# Patient Record
Sex: FEMALE | Race: WHITE | NOT HISPANIC OR LATINO
[De-identification: names, ages, dates, MRNs, and addresses within clinical notes are randomized per-mention and may not be internally consistent; named-entity substitution may affect disease eponyms.]

---

## 2018-02-26 ENCOUNTER — RESULT CHARGE (OUTPATIENT)
Age: 43
End: 2018-02-26

## 2018-02-27 ENCOUNTER — NON-APPOINTMENT (OUTPATIENT)
Age: 43
End: 2018-02-27

## 2018-02-27 ENCOUNTER — APPOINTMENT (OUTPATIENT)
Dept: FAMILY MEDICINE | Facility: CLINIC | Age: 43
End: 2018-02-27
Payer: COMMERCIAL

## 2018-02-27 VITALS
SYSTOLIC BLOOD PRESSURE: 128 MMHG | RESPIRATION RATE: 12 BRPM | DIASTOLIC BLOOD PRESSURE: 80 MMHG | WEIGHT: 200 LBS | BODY MASS INDEX: 35.44 KG/M2 | HEART RATE: 65 BPM | HEIGHT: 63 IN | OXYGEN SATURATION: 98 %

## 2018-02-27 DIAGNOSIS — Z87.898 PERSONAL HISTORY OF OTHER SPECIFIED CONDITIONS: ICD-10-CM

## 2018-02-27 DIAGNOSIS — Z00.00 ENCOUNTER FOR GENERAL ADULT MEDICAL EXAMINATION W/OUT ABNORMAL FINDINGS: ICD-10-CM

## 2018-02-27 DIAGNOSIS — Z87.09 PERSONAL HISTORY OF OTHER DISEASES OF THE RESPIRATORY SYSTEM: ICD-10-CM

## 2018-02-27 DIAGNOSIS — Z78.9 OTHER SPECIFIED HEALTH STATUS: ICD-10-CM

## 2018-02-27 LAB
BILIRUB UR QL STRIP: NEGATIVE
GLUCOSE UR-MCNC: NEGATIVE
HCG UR QL: 0.2 EU/DL
HGB UR QL STRIP.AUTO: NEGATIVE
KETONES UR-MCNC: NEGATIVE
LEUKOCYTE ESTERASE UR QL STRIP: NEGATIVE
NITRITE UR QL STRIP: NEGATIVE
PH UR STRIP: 7
PROT UR STRIP-MCNC: NEGATIVE
SP GR UR STRIP: 1.02

## 2018-02-27 PROCEDURE — 99396 PREV VISIT EST AGE 40-64: CPT | Mod: 25

## 2018-02-27 PROCEDURE — 93000 ELECTROCARDIOGRAM COMPLETE: CPT

## 2018-02-27 PROCEDURE — 81003 URINALYSIS AUTO W/O SCOPE: CPT | Mod: QW

## 2019-04-25 ENCOUNTER — APPOINTMENT (OUTPATIENT)
Dept: RADIOLOGY | Facility: CLINIC | Age: 44
End: 2019-04-25
Payer: COMMERCIAL

## 2019-04-25 PROCEDURE — 74220 X-RAY XM ESOPHAGUS 1CNTRST: CPT

## 2019-05-09 ENCOUNTER — TRANSCRIPTION ENCOUNTER (OUTPATIENT)
Age: 44
End: 2019-05-09

## 2019-05-09 ENCOUNTER — OUTPATIENT (OUTPATIENT)
Dept: OUTPATIENT SERVICES | Facility: HOSPITAL | Age: 44
LOS: 1 days | End: 2019-05-09
Payer: COMMERCIAL

## 2019-05-09 PROCEDURE — 71046 X-RAY EXAM CHEST 2 VIEWS: CPT | Mod: 26

## 2019-05-13 ENCOUNTER — APPOINTMENT (OUTPATIENT)
Dept: RADIOLOGY | Facility: CLINIC | Age: 44
End: 2019-05-13

## 2019-05-29 ENCOUNTER — APPOINTMENT (OUTPATIENT)
Dept: CARDIOLOGY | Facility: CLINIC | Age: 44
End: 2019-05-29
Payer: COMMERCIAL

## 2019-05-29 ENCOUNTER — NON-APPOINTMENT (OUTPATIENT)
Age: 44
End: 2019-05-29

## 2019-05-29 VITALS
WEIGHT: 202 LBS | OXYGEN SATURATION: 99 % | HEIGHT: 63 IN | BODY MASS INDEX: 35.79 KG/M2 | DIASTOLIC BLOOD PRESSURE: 80 MMHG | SYSTOLIC BLOOD PRESSURE: 132 MMHG | HEART RATE: 81 BPM

## 2019-05-29 DIAGNOSIS — Z82.49 FAMILY HISTORY OF ISCHEMIC HEART DISEASE AND OTHER DISEASES OF THE CIRCULATORY SYSTEM: ICD-10-CM

## 2019-05-29 PROCEDURE — 93000 ELECTROCARDIOGRAM COMPLETE: CPT

## 2019-05-29 PROCEDURE — 99205 OFFICE O/P NEW HI 60 MIN: CPT

## 2019-05-29 RX ORDER — FERROUS SULFATE 325(65) MG
325 (65 FE) TABLET ORAL DAILY
Refills: 0 | Status: ACTIVE | COMMUNITY

## 2019-05-29 RX ORDER — ZINC OXIDE 13 %
CREAM (GRAM) TOPICAL DAILY
Refills: 0 | Status: ACTIVE | COMMUNITY

## 2019-05-29 RX ORDER — OMEGA-3/DHA/EPA/FISH OIL 300-1000MG
CAPSULE ORAL DAILY
Refills: 0 | Status: ACTIVE | COMMUNITY

## 2019-05-29 NOTE — PHYSICAL EXAM
[General Appearance - Well Developed] : well developed [Normal Appearance] : normal appearance [Well Groomed] : well groomed [No Deformities] : no deformities [General Appearance - Well Nourished] : well nourished [General Appearance - In No Acute Distress] : no acute distress [Normal Conjunctiva] : the conjunctiva exhibited no abnormalities [Normal Oral Mucosa] : normal oral mucosa [Eyelids - No Xanthelasma] : the eyelids demonstrated no xanthelasmas [No Oral Pallor] : no oral pallor [No Oral Cyanosis] : no oral cyanosis [Normal Jugular Venous A Waves Present] : normal jugular venous A waves present [Normal Jugular Venous V Waves Present] : normal jugular venous V waves present [Heart Sounds] : normal S1 and S2 [No Jugular Venous Castaneda A Waves] : no jugular venous castaneda A waves [Heart Rate And Rhythm] : heart rate and rhythm were normal [Murmurs] : no murmurs present [Respiration, Rhythm And Depth] : normal respiratory rhythm and effort [Exaggerated Use Of Accessory Muscles For Inspiration] : no accessory muscle use [Auscultation Breath Sounds / Voice Sounds] : lungs were clear to auscultation bilaterally [Abdomen Soft] : soft [Abdomen Tenderness] : non-tender [Abdomen Mass (___ Cm)] : no abdominal mass palpated [Abnormal Walk] : normal gait [Gait - Sufficient For Exercise Testing] : the gait was sufficient for exercise testing [Cyanosis, Localized] : no localized cyanosis [Nail Clubbing] : no clubbing of the fingernails [Petechial Hemorrhages (___cm)] : no petechial hemorrhages [Skin Color & Pigmentation] : normal skin color and pigmentation [No Venous Stasis] : no venous stasis [] : no rash [Skin Lesions] : no skin lesions [No Xanthoma] : no  xanthoma was observed [No Skin Ulcers] : no skin ulcer [Affect] : the affect was normal [Oriented To Time, Place, And Person] : oriented to person, place, and time [No Anxiety] : not feeling anxious [Mood] : the mood was normal

## 2019-05-30 NOTE — HISTORY OF PRESENT ILLNESS
[FreeTextEntry1] : HPI:\par Maria Eugenia Stokes is a very pleasant 43-year-old female patient, came for preop cardiac clearance prior to gastric sleeve procedure.\par \par The patient has been suffering from life long obesity.  She is trying to rule out, but unfortunately she has been unsuccessful.  Now, she has been planned for gastric sleeve procedure.\par \par The patient has good functional capacity, but she does get short of breath very easily.  Denies PND, orthopnea, diaphoresis, dizziness, palpitations, or pedal edema.\par \par The patient also has a few episode of heartburn, although she is not too specific about it.  Although, she has no exertional chest pain.\par \par No prior history of CHF, MI, or syncope.  She is very much concerned about strong family history of premature coronary artery disease.\par

## 2019-05-30 NOTE — ASSESSMENT
[FreeTextEntry1] :  \par Morbid obesity.  The patient is planned for gastric sleeve procedure.\par \par Atypical chest pain describes as heartburn.  I have recommended echocardiogram for LV wall motion, LVEF and regular stress test to see inducible ischemia.  \par \par Risk factor modification has been discussed with her at a great length.  She will be reevaluated by me after cardiac testing.\par

## 2019-06-12 ENCOUNTER — APPOINTMENT (OUTPATIENT)
Dept: CARDIOLOGY | Facility: CLINIC | Age: 44
End: 2019-06-12
Payer: COMMERCIAL

## 2019-06-12 PROCEDURE — 93015 CV STRESS TEST SUPVJ I&R: CPT

## 2019-06-12 PROCEDURE — 93306 TTE W/DOPPLER COMPLETE: CPT

## 2019-06-18 ENCOUNTER — APPOINTMENT (OUTPATIENT)
Dept: CARDIOLOGY | Facility: CLINIC | Age: 44
End: 2019-06-18
Payer: COMMERCIAL

## 2019-06-18 VITALS
HEART RATE: 74 BPM | OXYGEN SATURATION: 96 % | DIASTOLIC BLOOD PRESSURE: 74 MMHG | BODY MASS INDEX: 35.43 KG/M2 | SYSTOLIC BLOOD PRESSURE: 122 MMHG | WEIGHT: 200 LBS

## 2019-06-18 PROCEDURE — 99214 OFFICE O/P EST MOD 30 MIN: CPT

## 2019-06-18 NOTE — ASSESSMENT
[FreeTextEntry1] :  PLAN 6-18-19\par \par -Atypical chest pain describes as heartburn and sob with risk factors of obesity, elevated triglycerides and family hx. Echocardiogram as above with  noted echodensity above the MV in LV cavity.  Possible artifact versus prominent papillary muscle or other mass lesion. Cardiac MRI with contrast was advised for further evaluation. ETT with noted shortness of breath and equivocal for ischemia by EKG. Given patient's risk factors, persistent shortness of breath, family history and pending surgery we have advised a stress echocardiogram for further ischemic evaluation. She's currently stable.\par \par -Preop. We will discuss clearance once testing has been reviewed.\par \par -Lipid panel as above with elevated triglycerides. ASCVD risk <1%. Patient aware to follow a strict low transected treated fat diet.\par \par -Morbid obesity.  The patient is planned for gastric sleeve procedure.\par \par \par We will follow the patient after recommended testing has been completed, sooner if any changes in symptoms or status\par \par Sincerely,\par \par Missy Swanson PA-C\par patient reviewed and plan advised by supervising physician Dr. Valentino\par \par

## 2019-06-18 NOTE — HISTORY OF PRESENT ILLNESS
[FreeTextEntry1] : \par Mrs. Stokes is a very pleasant 43-year-old female patient that came in today 6-18-19 to review the results of her cardiac testing and finish preop cardiac clearance prior to gastric sleeve procedure.(pending august)\par \par She was last seen 5-29-19. \par As mentioned previously she has been suffering from life long obesity and has been unsuccessful with weight loss.  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 \par \par As mentioned previously she does get mild short of breath x years with no recent changes.\par The patient also had a few episode of intense heartburn (lasting several hours, not related to eating), described as burning. Not alleviated by anything in particular, noted at rest. She had the episodes 2x about a week apart. Last episode was a couple of months ago. \par She has chronic transient palpitations, that have been unchanged x years, no associated symptoms. \par She denies syncope, orthopena and PND. \par \par No prior history of CHF, MI, or syncope.  She is very much concerned about strong family history of premature coronary artery disease.\par \par Labs/tests\par \par Labs 11-21-18 [id number], TSH 1.22, LDL 93, triglycerides 188, HDL 57, ALT 11, AST 18, BUN 15, creatinine 0.62, potassium 4.9, sodium 139, hemoglobin 12.8, hematocrit 40.1, platelets 310 and white blood cell 7.5\par \par Echocardiogram 6-12-19 EF 65% with normal ventricular function, minimal MR, mild TR, minimal MD, normal pulmonary pressures. Echodensity above and the PDA and LV cavity possible artifact versus prominent papillary muscle or other mass lesion\par \par ETT 6-12-19 80 she exercised 7 minutes and 26 seconds reaching 85% of MPHR. Shortness of breath was noted. EKG changes equivocal for ischemia\par

## 2019-06-18 NOTE — REVIEW OF SYSTEMS
[Shortness Of Breath] : shortness of breath [Chest Pain] : chest pain [Palpitations] : palpitations [Heartburn] : heartburn

## 2019-06-18 NOTE — PHYSICAL EXAM
[Normal Appearance] : normal appearance [No Deformities] : no deformities [] : no respiratory distress [Respiration, Rhythm And Depth] : normal respiratory rhythm and effort [Exaggerated Use Of Accessory Muscles For Inspiration] : no accessory muscle use [Bowel Sounds] : normal bowel sounds [Abdomen Soft] : soft [Abnormal Walk] : normal gait [Skin Color & Pigmentation] : normal skin color and pigmentation [Affect] : the affect was normal [Mood] : the mood was normal [FreeTextEntry1] : mild edema with chronic compression stockings due to varicosities

## 2019-06-26 ENCOUNTER — APPOINTMENT (OUTPATIENT)
Dept: CARDIOLOGY | Facility: CLINIC | Age: 44
End: 2019-06-26
Payer: COMMERCIAL

## 2019-06-26 PROCEDURE — 93351 STRESS TTE COMPLETE: CPT

## 2019-06-28 ENCOUNTER — TRANSCRIPTION ENCOUNTER (OUTPATIENT)
Age: 44
End: 2019-06-28

## 2019-07-18 ENCOUNTER — APPOINTMENT (OUTPATIENT)
Dept: MRI IMAGING | Facility: CLINIC | Age: 44
End: 2019-07-18

## 2019-07-22 ENCOUNTER — OUTPATIENT (OUTPATIENT)
Dept: OUTPATIENT SERVICES | Facility: HOSPITAL | Age: 44
LOS: 1 days | End: 2019-07-22
Payer: COMMERCIAL

## 2019-07-22 ENCOUNTER — APPOINTMENT (OUTPATIENT)
Dept: MRI IMAGING | Facility: HOSPITAL | Age: 44
End: 2019-07-22

## 2019-07-22 DIAGNOSIS — Z00.00 ENCOUNTER FOR GENERAL ADULT MEDICAL EXAMINATION WITHOUT ABNORMAL FINDINGS: ICD-10-CM

## 2019-07-22 DIAGNOSIS — I51.89 OTHER ILL-DEFINED HEART DISEASES: ICD-10-CM

## 2019-07-22 PROCEDURE — 75561 CARDIAC MRI FOR MORPH W/DYE: CPT | Mod: 26

## 2019-07-22 PROCEDURE — A9585: CPT

## 2019-07-22 PROCEDURE — 75561 CARDIAC MRI FOR MORPH W/DYE: CPT

## 2019-07-24 ENCOUNTER — OUTPATIENT (OUTPATIENT)
Dept: OUTPATIENT SERVICES | Facility: HOSPITAL | Age: 44
LOS: 1 days | End: 2019-07-24

## 2019-07-24 ENCOUNTER — APPOINTMENT (OUTPATIENT)
Dept: MRI IMAGING | Facility: HOSPITAL | Age: 44
End: 2019-07-24

## 2019-07-24 DIAGNOSIS — I51.89 OTHER ILL-DEFINED HEART DISEASES: ICD-10-CM

## 2019-07-24 PROCEDURE — 75557 CARDIAC MRI FOR MORPH: CPT

## 2019-07-24 PROCEDURE — 75557 CARDIAC MRI FOR MORPH: CPT | Mod: 26

## 2019-07-29 ENCOUNTER — APPOINTMENT (OUTPATIENT)
Dept: CARDIOLOGY | Facility: CLINIC | Age: 44
End: 2019-07-29
Payer: COMMERCIAL

## 2019-07-29 VITALS
HEIGHT: 63 IN | HEART RATE: 80 BPM | BODY MASS INDEX: 35.44 KG/M2 | OXYGEN SATURATION: 95 % | WEIGHT: 200 LBS | DIASTOLIC BLOOD PRESSURE: 78 MMHG | SYSTOLIC BLOOD PRESSURE: 116 MMHG

## 2019-07-29 DIAGNOSIS — I51.89 OTHER ILL-DEFINED HEART DISEASES: ICD-10-CM

## 2019-07-29 DIAGNOSIS — R10.9 UNSPECIFIED ABDOMINAL PAIN: ICD-10-CM

## 2019-07-29 PROCEDURE — 99214 OFFICE O/P EST MOD 30 MIN: CPT

## 2019-07-29 RX ORDER — ALPRAZOLAM 0.5 MG/1
0.5 TABLET ORAL
Qty: 2 | Refills: 0 | Status: DISCONTINUED | COMMUNITY
Start: 2019-07-10 | End: 2019-07-29

## 2019-07-31 ENCOUNTER — OUTPATIENT (OUTPATIENT)
Dept: OUTPATIENT SERVICES | Facility: HOSPITAL | Age: 44
LOS: 1 days | End: 2019-07-31

## 2019-10-09 ENCOUNTER — OUTPATIENT (OUTPATIENT)
Dept: OUTPATIENT SERVICES | Facility: HOSPITAL | Age: 44
LOS: 1 days | End: 2019-10-09

## 2019-10-15 ENCOUNTER — INPATIENT (INPATIENT)
Facility: HOSPITAL | Age: 44
LOS: 0 days | Discharge: HOME | End: 2019-10-16
Payer: COMMERCIAL

## 2019-10-15 PROCEDURE — 74220 X-RAY XM ESOPHAGUS 1CNTRST: CPT | Mod: 26

## 2019-10-28 ENCOUNTER — OUTPATIENT (OUTPATIENT)
Dept: OUTPATIENT SERVICES | Facility: HOSPITAL | Age: 44
LOS: 1 days | End: 2019-10-28

## 2020-01-29 ENCOUNTER — APPOINTMENT (OUTPATIENT)
Dept: CARDIOLOGY | Facility: CLINIC | Age: 45
End: 2020-01-29
Payer: COMMERCIAL

## 2020-01-29 ENCOUNTER — NON-APPOINTMENT (OUTPATIENT)
Age: 45
End: 2020-01-29

## 2020-01-29 VITALS
DIASTOLIC BLOOD PRESSURE: 70 MMHG | BODY MASS INDEX: 33.38 KG/M2 | OXYGEN SATURATION: 100 % | HEIGHT: 60 IN | HEART RATE: 60 BPM | SYSTOLIC BLOOD PRESSURE: 116 MMHG | WEIGHT: 170 LBS

## 2020-01-29 DIAGNOSIS — Z82.49 FAMILY HISTORY OF ISCHEMIC HEART DISEASE AND OTHER DISEASES OF THE CIRCULATORY SYSTEM: ICD-10-CM

## 2020-01-29 DIAGNOSIS — R55 SYNCOPE AND COLLAPSE: ICD-10-CM

## 2020-01-29 DIAGNOSIS — R06.02 SHORTNESS OF BREATH: ICD-10-CM

## 2020-01-29 DIAGNOSIS — E66.01 MORBID (SEVERE) OBESITY DUE TO EXCESS CALORIES: ICD-10-CM

## 2020-01-29 DIAGNOSIS — R12 HEARTBURN: ICD-10-CM

## 2020-01-29 PROCEDURE — 99214 OFFICE O/P EST MOD 30 MIN: CPT

## 2020-01-29 PROCEDURE — 93000 ELECTROCARDIOGRAM COMPLETE: CPT

## 2020-01-29 RX ORDER — CHLORHEXIDINE GLUCONATE 4 %
400 LIQUID (ML) TOPICAL DAILY
Refills: 0 | Status: ACTIVE | COMMUNITY

## 2020-01-29 RX ORDER — BIOTIN 10 MG
10000 TABLET ORAL DAILY
Refills: 0 | Status: ACTIVE | COMMUNITY

## 2020-01-29 RX ORDER — CHROMIUM 200 MCG
1000 TABLET ORAL DAILY
Refills: 0 | Status: ACTIVE | COMMUNITY

## 2020-01-29 RX ORDER — NICOTINE POLACRILEX 2 MG
5000 LOZENGE BUCCAL DAILY
Refills: 0 | Status: ACTIVE | COMMUNITY

## 2020-01-29 RX ORDER — NICOTINE POLACRILEX 2 MG
5000 LOZENGE BUCCAL
Refills: 0 | Status: ACTIVE | COMMUNITY

## 2020-01-29 RX ORDER — MULTIVIT-MIN/IRON/FOLIC ACID/K 18-600-40
500 CAPSULE ORAL DAILY
Refills: 0 | Status: ACTIVE | COMMUNITY

## 2020-04-26 ENCOUNTER — MESSAGE (OUTPATIENT)
Age: 45
End: 2020-04-26

## 2020-05-07 ENCOUNTER — OUTPATIENT (OUTPATIENT)
Dept: OUTPATIENT SERVICES | Facility: HOSPITAL | Age: 45
LOS: 1 days | End: 2020-05-07

## 2020-05-07 ENCOUNTER — APPOINTMENT (OUTPATIENT)
Dept: DISASTER EMERGENCY | Facility: HOSPITAL | Age: 45
End: 2020-05-07

## 2020-05-08 LAB
SARS-COV-2 IGG SERPL IA-ACNC: <0.1 INDEX
SARS-COV-2 IGG SERPL QL IA: NEGATIVE

## 2020-07-14 ENCOUNTER — APPOINTMENT (OUTPATIENT)
Age: 45
End: 2020-07-14
Payer: COMMERCIAL

## 2020-07-14 ENCOUNTER — RESULT CHARGE (OUTPATIENT)
Age: 45
End: 2020-07-14

## 2020-07-14 VITALS
HEIGHT: 63 IN | DIASTOLIC BLOOD PRESSURE: 80 MMHG | BODY MASS INDEX: 25.69 KG/M2 | WEIGHT: 145 LBS | SYSTOLIC BLOOD PRESSURE: 127 MMHG

## 2020-07-14 DIAGNOSIS — Z83.3 FAMILY HISTORY OF DIABETES MELLITUS: ICD-10-CM

## 2020-07-14 DIAGNOSIS — Z87.828 PERSONAL HISTORY OF OTHER (HEALED) PHYSICAL INJURY AND TRAUMA: ICD-10-CM

## 2020-07-14 DIAGNOSIS — Z82.49 FAMILY HISTORY OF ISCHEMIC HEART DISEASE AND OTHER DISEASES OF THE CIRCULATORY SYSTEM: ICD-10-CM

## 2020-07-14 DIAGNOSIS — Z83.49 FAMILY HISTORY OF OTHER ENDOCRINE, NUTRITIONAL AND METABOLIC DISEASES: ICD-10-CM

## 2020-07-14 DIAGNOSIS — N81.9 FEMALE GENITAL PROLAPSE, UNSPECIFIED: ICD-10-CM

## 2020-07-14 DIAGNOSIS — Z78.9 OTHER SPECIFIED HEALTH STATUS: ICD-10-CM

## 2020-07-14 DIAGNOSIS — Z87.01 PERSONAL HISTORY OF PNEUMONIA (RECURRENT): ICD-10-CM

## 2020-07-14 DIAGNOSIS — Z86.39 PERSONAL HISTORY OF OTHER ENDOCRINE, NUTRITIONAL AND METABOLIC DISEASE: ICD-10-CM

## 2020-07-14 DIAGNOSIS — Z80.9 FAMILY HISTORY OF MALIGNANT NEOPLASM, UNSPECIFIED: ICD-10-CM

## 2020-07-14 DIAGNOSIS — Z82.3 FAMILY HISTORY OF STROKE: ICD-10-CM

## 2020-07-14 DIAGNOSIS — Z60.2 PROBLEMS RELATED TO LIVING ALONE: ICD-10-CM

## 2020-07-14 DIAGNOSIS — Z87.09 PERSONAL HISTORY OF OTHER DISEASES OF THE RESPIRATORY SYSTEM: ICD-10-CM

## 2020-07-14 LAB
BILIRUB UR QL STRIP: NEGATIVE
CLARITY UR: CLEAR
COLLECTION METHOD: NORMAL
GLUCOSE UR-MCNC: NEGATIVE
HCG UR QL: 0.2 EU/DL
HGB UR QL STRIP.AUTO: NEGATIVE
KETONES UR-MCNC: NEGATIVE
LEUKOCYTE ESTERASE UR QL STRIP: NEGATIVE
NITRITE UR QL STRIP: NEGATIVE
PH UR STRIP: 7
PROT UR STRIP-MCNC: NEGATIVE
SP GR UR STRIP: 1.01

## 2020-07-14 PROCEDURE — 99205 OFFICE O/P NEW HI 60 MIN: CPT

## 2020-07-14 SDOH — SOCIAL STABILITY - SOCIAL INSECURITY: PROBLEMS RELATED TO LIVING ALONE: Z60.2

## 2020-07-14 NOTE — LETTER BODY
[Dear  ___] : Dear ~LIBERTAD, [Attached please find my note.] : Attached please find my note. [Thank you very much for allowing me to participate in the care of this patient. If you have any questions, please do not hesitate to contact me] : Thank you very much for allowing me to participate in the care of this patient. If you have any questions, please do not hesitate to contact me.

## 2020-07-14 NOTE — PHYSICAL EXAM
[Chaperone Present] : A chaperone was present in the examining room during all aspects of the physical examination [Warm and Dry] : was warm and dry to touch [Normal Gait] : gait was normal [Labia Minora] : were normal [Normal Appearance] : general appearance was normal [2] : 2 [Aa ____] : Aa [unfilled] [C ____] : C [unfilled] [Ba ____] : Ba [unfilled] [PB ____] : PB [unfilled] [GH ____] : GH [unfilled] [TVL ____] : TVL  [unfilled] [Bp ____] : Bp [unfilled] [Ap ____] : Ap [unfilled] [D ____] : D [unfilled] [Normal] : no abnormalities [Post Void Residual ____ml] : post void residual was [unfilled] ml [Normal rectal exam] : was normal [No Acute Distress] : in no acute distress [FreeTextEntry1] : General: Well, appearing. Alert and orientated. No acute distress\par HEENT: Normocephalic, atraumatic and extraocular muscles appear to be intact \par Neck: Full range of motion, no obvious lymphadenopathy, deformities, or masses noted \par Respiratory: Speaking in full sentences comfortably, normal work of breathing and no cough during visit\par Musculoskeletal: active full range of motion in extremities \par Extremities: No upper extremity edema noted\par Skin: no obvious rash or skin lesions\par Neuro: Orientated X 3, speech is fluent, normal rate\par Psych: Normal mood and affect \par \par  [Well developed] : well developed [Normal Memory] : ~T memory was ~L impaired [Well Nourished] : ~L well nourished [Normal Mood/Affect] : mood and/or affect are not normal [Inguinal LAD] : no adenopathy was noted in the inguinal lymph nodes [Tenderness] : ~T no ~M abdominal tenderness observed [Distended] : not distended [FreeTextEntry4] : bilateral levator spasm, R > Left, qtip tenderness to 4 oclock

## 2020-07-14 NOTE — DISCUSSION/SUMMARY
[FreeTextEntry1] : \cate Del Cid presents with persistent genital arousal disorder, she has been undergoing PT and has improvement in her symptoms with behavorial modifications including daily intercourse with her . We discussed continuing with PT, she sees a therapist, discussed options including vulvar care and avoiding irritants, complete full workup with neurologist. Discussed options including medications, nerve blocks, botox but currently her symptoms are manageable and she would like to complete workup first. lidocaine given to see if improves symptoms symptoms, will return in 2 months or sooner. regarding prolapse, asymptomatic at this time, will treat PGAD first. All questions were answered.

## 2020-07-14 NOTE — HISTORY OF PRESENT ILLNESS
[Cystocele (Obstetric)] : no [Stress Incontinence] : no [Urge Incontinence Of Urine] : no [Unable To Restrain Bowel Movement] : mild [Urinary Frequency] : no [Hematuria] : no [Urinary Frequency More Than Twice At Night (Nocturia)] : no nocturia [Feelings Of Urinary Urgency] : no [Pain During Urination (Dysuria)] : no [Urinary Tract Infection] : no [Constipation Obstructed Defecation] : no [] : no [Incomplete Emptying Of Stool] : no [Vaginal Pain] : no [Pelvic Pain] : no [Rectal Prolapse] : mild [FreeTextEntry1] : \par reports 2 months ago started having unprovoked feeling of persistent arousal, she reports that it started very severe and a shirt touching her skin would cause arousal, now it is slowly improving, she has intercourse daily which has decreased the arousal  significantly\par \par no difference in stress, has gastric sleeve and lost a lot of weight, she reports previous to this she had no interested in sex, she reports the sensation goes away with intercourse, she occasionally masturbates, she reports 10/10 sensation decreasing to 1/10\par she reports rare UUI, no KASIA, nocturia X 1, frequency, \par \par no h/o depression/anxiety\par works as a [employer] disease\par has not tried any medications \par \par she was seen by Jessica Venegas, she was seen by her neurologist and MRI was ordered to evaluate \par she has been seen by pelvic floor PT , she is doing biofeedback

## 2020-07-14 NOTE — REASON FOR VISIT
[Initial Visit ___] : an initial visit for [unfilled] [Questionnaire Received] : Patient questionnaire received [Intake Form Reviewed] : Patient intake form with past medical history, surgical history, family history and social history reviewed today [________] : [unfilled]

## 2020-07-14 NOTE — OB HISTORY
[Vaginal ___] : [unfilled] vaginal delivery(s) [Definite ___ (Date)] : the last menstrual period was [unfilled] [Last Pap Smear ___] : date of last pap smear was on [unfilled] [Sexually Active] : sexually active

## 2020-09-18 ENCOUNTER — APPOINTMENT (OUTPATIENT)
Age: 45
End: 2020-09-18
Payer: COMMERCIAL

## 2020-09-18 ENCOUNTER — RESULT CHARGE (OUTPATIENT)
Age: 45
End: 2020-09-18

## 2020-09-18 VITALS
BODY MASS INDEX: 23.92 KG/M2 | WEIGHT: 135 LBS | DIASTOLIC BLOOD PRESSURE: 60 MMHG | SYSTOLIC BLOOD PRESSURE: 98 MMHG | HEIGHT: 63 IN

## 2020-09-18 DIAGNOSIS — F52.9 UNSPECIFIED SEXUAL DYSFUNCTION NOT DUE TO A SUBSTANCE OR KNOWN PHYSIOLOGICAL CONDITION: ICD-10-CM

## 2020-09-18 LAB
BILIRUB UR QL STRIP: NEGATIVE
CLARITY UR: CLEAR
COLLECTION METHOD: NORMAL
GLUCOSE UR-MCNC: NEGATIVE
HCG UR QL: 0.2 EU/DL
HGB UR QL STRIP.AUTO: NEGATIVE
KETONES UR-MCNC: NEGATIVE
LEUKOCYTE ESTERASE UR QL STRIP: NEGATIVE
NITRITE UR QL STRIP: NEGATIVE
PH UR STRIP: 7.5
PROT UR STRIP-MCNC: NEGATIVE
SP GR UR STRIP: 1.01

## 2020-09-18 PROCEDURE — 99213 OFFICE O/P EST LOW 20 MIN: CPT

## 2020-09-18 NOTE — REASON FOR VISIT
[Follow-Up Visit_____] : a follow-up visit for [unfilled] [Questionnaire Received] : Patient questionnaire received [Intake Form Reviewed] : Patient intake form with past medical history, surgical history, family history and social history reviewed today [________] : [unfilled]

## 2020-09-18 NOTE — HISTORY OF PRESENT ILLNESS
[Cystocele (Obstetric)] : no [Rectal Prolapse] : no [Stress Incontinence] : no [Urge Incontinence Of Urine] : no [Unable To Restrain Bowel Movement] : mild [Urinary Frequency] : no [Hematuria] : no [Urinary Frequency More Than Twice At Night (Nocturia)] : no nocturia [Feelings Of Urinary Urgency] : no [Pain During Urination (Dysuria)] : no [Urinary Tract Infection] : no [Constipation Obstructed Defecation] : no [] : no [Incomplete Emptying Of Stool] : no [Pelvic Pain] : no [Vaginal Pain] : no [FreeTextEntry1] : \par h/o of PGAD, she has been doing PT with much improvement in her symptoms, can go 3 days with no symptoms, sex every other day, she reports she did not use lidocaine, overall she is very happy with the response and at this time does not desire further treatment, she continues to have vaginal bulge which is slightly improved. \par \par \par \par she was seen by Jessica Venegas, she was seen by her neurologist and MRI was ordered to evaluate \par she has been seen by pelvic floor PT , she is doing biofeedback, she has had negative pelvic imaging about a year ago

## 2021-06-09 ENCOUNTER — OUTPATIENT (OUTPATIENT)
Dept: OUTPATIENT SERVICES | Facility: HOSPITAL | Age: 46
LOS: 1 days | End: 2021-06-09

## 2021-10-07 DIAGNOSIS — Z30.09 ENCOUNTER FOR OTHER GENERAL COUNSELING AND ADVICE ON CONTRACEPTION: ICD-10-CM

## 2021-10-29 DIAGNOSIS — N92.6 IRREGULAR MENSTRUATION, UNSPECIFIED: ICD-10-CM

## 2021-10-29 RX ORDER — NORGESTIMATE AND ETHINYL ESTRADIOL 0.25-0.035
0.25-35 KIT ORAL DAILY
Qty: 2 | Refills: 0 | Status: ACTIVE | COMMUNITY
Start: 2021-10-29 | End: 1900-01-01

## 2022-01-20 ENCOUNTER — APPOINTMENT (OUTPATIENT)
Dept: ULTRASOUND IMAGING | Facility: CLINIC | Age: 47
End: 2022-01-20
Payer: COMMERCIAL

## 2022-01-20 PROCEDURE — 76856 US EXAM PELVIC COMPLETE: CPT

## 2022-01-26 ENCOUNTER — APPOINTMENT (OUTPATIENT)
Dept: CT IMAGING | Facility: CLINIC | Age: 47
End: 2022-01-26
Payer: COMMERCIAL

## 2022-01-26 PROCEDURE — 74177 CT ABD & PELVIS W/CONTRAST: CPT

## 2022-07-11 ENCOUNTER — RESULT CHARGE (OUTPATIENT)
Age: 47
End: 2022-07-11

## 2022-07-11 ENCOUNTER — APPOINTMENT (OUTPATIENT)
Age: 47
End: 2022-07-11

## 2022-07-11 VITALS
BODY MASS INDEX: 23.92 KG/M2 | HEIGHT: 63 IN | WEIGHT: 135 LBS | DIASTOLIC BLOOD PRESSURE: 88 MMHG | SYSTOLIC BLOOD PRESSURE: 110 MMHG

## 2022-07-11 DIAGNOSIS — R35.1 NOCTURIA: ICD-10-CM

## 2022-07-11 DIAGNOSIS — K59.00 CONSTIPATION, UNSPECIFIED: ICD-10-CM

## 2022-07-11 PROCEDURE — 99214 OFFICE O/P EST MOD 30 MIN: CPT

## 2022-07-11 PROCEDURE — 81003 URINALYSIS AUTO W/O SCOPE: CPT | Mod: QW

## 2022-07-11 NOTE — DISCUSSION/SUMMARY
[FreeTextEntry1] : \par Maria Eugenia presents with POP, resolution of PGAD. . We reviewed management options for her prolapse including: observation, pelvic floor exercises with and without physical therapy, pessary, and surgical management. We reviewed the different types of surgical procedures including abdominal, vaginal, and robotic/laparoscopic procedures. In addition we reviewed procedures that involve hysterectomy as well as surgeries with uterine preservation. Mesh and non-mesh options were reviewed.  Will complete workup and return. All questioned answered.\par \par [] inguinal hernia repair needed \par [] copy of PAP\par [] UDS\par [] MRI def\par [] will call me after repeat PAP and MRI and we will determine plan\par considering robotic reconstruction  vs vaginal reconstruction, pending PAP, will need Gen Surg and possible CRS depending on MRI (will determine location of case after MRI)\par

## 2022-07-11 NOTE — HISTORY OF PRESENT ILLNESS
[Cystocele (Obstetric)] : no [Rectal Prolapse] : no [Stress Incontinence] : no [Urge Incontinence Of Urine] : no [Unable To Restrain Bowel Movement] : mild [Urinary Frequency] : no [Hematuria] : no [Urinary Frequency More Than Twice At Night (Nocturia)] : no nocturia [Feelings Of Urinary Urgency] : no [Pain During Urination (Dysuria)] : no [Urinary Tract Infection] : no [Constipation Obstructed Defecation] : no [] : no [Incomplete Emptying Of Stool] : no [Pelvic Pain] : no [Vaginal Pain] : no [FreeTextEntry1] : \par  47 y/o presents for f/u on bulge and PGAD\par she is on nortriptyline with releif of PGAD, she has had no symptoms in 1 yr\par she reports worsening of bulge, she reports KASIA with full bladder\par \par she has a right inguinal hernia

## 2022-07-11 NOTE — PHYSICAL EXAM
[Chaperone Present] : A chaperone was present in the examining room during all aspects of the physical examination [Labia Majora] : were normal [Labia Minora] : were normal [Normal Appearance] : general appearance was normal [Aa ____] : Aa [unfilled] [Ba ____] : Ba [unfilled] [C ____] : C [unfilled] [GH ____] : GH [unfilled] [PB ____] : PB [unfilled] [TVL ____] : TVL  [unfilled] [Ap ____] : Ap [unfilled] [Bp ____] : Bp [unfilled] [D ____] : D [unfilled] [Soft] :  the cervix was soft [Uterine Adnexae] : were not tender and not enlarged [Normal] : no abnormalities

## 2022-08-05 ENCOUNTER — APPOINTMENT (OUTPATIENT)
Dept: UROGYNECOLOGY | Facility: CLINIC | Age: 47
End: 2022-08-05

## 2022-08-05 PROCEDURE — 51784 ANAL/URINARY MUSCLE STUDY: CPT

## 2022-08-05 PROCEDURE — 51741 ELECTRO-UROFLOWMETRY FIRST: CPT

## 2022-08-05 PROCEDURE — 51729 CYSTOMETROGRAM W/VP&UP: CPT

## 2022-08-05 PROCEDURE — 51797 INTRAABDOMINAL PRESSURE TEST: CPT

## 2022-08-17 ENCOUNTER — APPOINTMENT (OUTPATIENT)
Dept: COLORECTAL SURGERY | Facility: CLINIC | Age: 47
End: 2022-08-17

## 2022-08-17 VITALS
WEIGHT: 138 LBS | HEIGHT: 63 IN | DIASTOLIC BLOOD PRESSURE: 80 MMHG | SYSTOLIC BLOOD PRESSURE: 110 MMHG | BODY MASS INDEX: 24.45 KG/M2

## 2022-08-17 DIAGNOSIS — N81.6 RECTOCELE: ICD-10-CM

## 2022-08-17 DIAGNOSIS — R19.8 OTHER SPECIFIED SYMPTOMS AND SIGNS INVOLVING THE DIGESTIVE SYSTEM AND ABDOMEN: ICD-10-CM

## 2022-08-17 DIAGNOSIS — R10.2 PELVIC AND PERINEAL PAIN: ICD-10-CM

## 2022-08-17 PROCEDURE — 46600 DIAGNOSTIC ANOSCOPY SPX: CPT

## 2022-08-17 PROCEDURE — 99203 OFFICE O/P NEW LOW 30 MIN: CPT | Mod: 25

## 2022-08-17 RX ORDER — TRIAMCINOLONE ACETONIDE 1 MG/G
0.1 CREAM TOPICAL
Qty: 454 | Refills: 0 | Status: DISCONTINUED | COMMUNITY
Start: 2019-03-05 | End: 2022-08-17

## 2022-08-17 RX ORDER — DROSPIRENONE AND ETHINYL ESTRADIOL 0.03MG-3MG
3-0.03 KIT ORAL DAILY
Qty: 2 | Refills: 0 | Status: DISCONTINUED | COMMUNITY
Start: 2021-10-07 | End: 2022-08-17

## 2022-08-17 RX ORDER — HYDROCORTISONE 25 MG/G
2.5 CREAM TOPICAL
Qty: 1 | Refills: 1 | Status: ACTIVE | COMMUNITY
Start: 2022-08-17 | End: 1900-01-01

## 2022-08-17 NOTE — HISTORY OF PRESENT ILLNESS
[FreeTextEntry1] : 46-year-old female who presents for consultation for abnormal defecation.  She has noticed prolapsing rectal tissue since 2017.  This prolapse would occur only while straining with constipation.  He reduces spontaneously.  She has been using daily magnesium and probiotic which has helped her symptoms significantly.  She denies any rectal bleeding although would seldom have blood while wiping.  She has a history of pelvic pain which has significantly improved with nortriptyline and has also had pelvic floor physical therapy.  She has a large cystocele and prolapse and is in the process of scheduling surgery with Dr. Loyd.  She is scheduled to have a colonoscopy in the near future.

## 2022-08-17 NOTE — PHYSICAL EXAM
[Excoriation] : no perianal excoriation [Normal] : was normal [None] : there was no rectal mass  [Gross Blood] : no gross blood [Respiratory Effort] : normal respiratory effort [Normal Rate and Rhythm] : normal rate and rhythm [Calm] : calm [de-identified] : Soft, nontender, nondistended.  No mass or hernias initiated. [de-identified] : No overt rectal prolapse seen. [de-identified] : Grade 1 internal hemorrhoids [de-identified] : Well-appearing, in no distress [de-identified] : Normocephalic, Atraumatic [de-identified] : Moves extremities without difficulty [de-identified] : Warm and dry [de-identified] : Alert and oriented x3

## 2022-08-17 NOTE — CONSULT LETTER
[Dear  ___] : Dear  [unfilled], [Consult Letter:] : I had the pleasure of evaluating your patient, [unfilled]. [Please see my note below.] : Please see my note below. [Consult Closing:] : Thank you very much for allowing me to participate in the care of this patient.  If you have any questions, please do not hesitate to contact me. [Sincerely,] : Sincerely, [FreeTextEntry3] : Amado Daley MD\par

## 2022-08-18 ENCOUNTER — APPOINTMENT (OUTPATIENT)
Dept: SURGERY | Facility: CLINIC | Age: 47
End: 2022-08-18

## 2022-08-18 VITALS
SYSTOLIC BLOOD PRESSURE: 103 MMHG | HEIGHT: 62 IN | WEIGHT: 137 LBS | RESPIRATION RATE: 12 BRPM | BODY MASS INDEX: 25.21 KG/M2 | TEMPERATURE: 97.2 F | DIASTOLIC BLOOD PRESSURE: 63 MMHG | HEART RATE: 62 BPM | OXYGEN SATURATION: 100 %

## 2022-08-18 DIAGNOSIS — K40.90 UNILATERAL INGUINAL HERNIA, W/OUT OBSTRUCTION OR GANGRENE, NOT SPECIFIED AS RECURRENT: ICD-10-CM

## 2022-08-18 PROCEDURE — 99204 OFFICE O/P NEW MOD 45 MIN: CPT

## 2022-08-18 NOTE — CONSULT LETTER
[Dear  ___] : Dear  [unfilled], [Consult Letter:] : I had the pleasure of evaluating your patient, [unfilled]. [Please see my note below.] : Please see my note below. [Consult Closing:] : Thank you very much for allowing me to participate in the care of this patient.  If you have any questions, please do not hesitate to contact me. [Sincerely,] : Sincerely, [FreeTextEntry3] : Gurinder Linton MD\par General, Laparoscopic, and Bariatric Surgery\par [facility]

## 2022-08-18 NOTE — PHYSICAL EXAM
[No Rash or Lesion] : No rash or lesion [Alert] : alert [Oriented to Person] : oriented to person [Oriented to Place] : oriented to place [Oriented to Time] : oriented to time [Calm] : calm [JVD] : no jugular venous distention  [de-identified] : No acute distress [de-identified] : No respiratory distress [de-identified] : Regular rate [de-identified] : soft, nontender. no rebound or guarding. palpable left inguinal hernia - reducible, nontender [de-identified] : normal range of motion

## 2022-08-18 NOTE — ASSESSMENT
[FreeTextEntry1] : Ms. MELGAR is a 46 year old woman with left inguinal hernia. We discussed the options of robotic/laparoscopic repair, open repair, and nonoperative management. We discussed risks/benefits of performing surgery as joint case with GYN and CRS. The risks/benefits and alternatives were discussed at length and all questions were answered. We discussed the risks and benefits of the use of mesh. The patient appears to understand and wishes to proceed with robotic left inguinal hernia repair as joint case. .

## 2022-08-18 NOTE — HISTORY OF PRESENT ILLNESS
[de-identified] : Ms. MELGAR is a 46 year old woman with pelvic organ prolapse, rectal prolapse, and left groin bulge, referred by Dr. Loyd for evaluation for hernia repair as joint case with GYN/CRS. Reports intermittent pain, especially at end of day and with activity. No incarceration. Denies fever/chills/nausea/emesis or changes in bowel or bladder habits. Tolerating diet. Normal bowel movements.\par \par denies smoking\par \par CTAP 1/2022 reviewed - small left inguinal defect containing fat appreciated

## 2022-08-18 NOTE — PLAN
[FreeTextEntry1] : Ms. MELGAR is a 46 year old woman with left inguinal hernia. We discussed the options of robotic/laparoscopic repair, open repair, and nonoperative management. The risks/benefits and alternatives were discussed at length and all questions were answered. We discussed the risks and benefits of the use of mesh. The patient appears to understand and wishes to proceed with robotic left inguinal hernia repair with mesh.\par \par Will coordinate with Dr. Loyd to perform as joint case.

## 2022-09-01 ENCOUNTER — APPOINTMENT (OUTPATIENT)
Dept: MRI IMAGING | Facility: CLINIC | Age: 47
End: 2022-09-01

## 2022-09-01 ENCOUNTER — APPOINTMENT (OUTPATIENT)
Dept: OBGYN | Facility: CLINIC | Age: 47
End: 2022-09-01

## 2022-09-01 ENCOUNTER — OUTPATIENT (OUTPATIENT)
Dept: OUTPATIENT SERVICES | Facility: HOSPITAL | Age: 47
LOS: 1 days | End: 2022-09-01
Payer: COMMERCIAL

## 2022-09-01 VITALS
HEIGHT: 62 IN | SYSTOLIC BLOOD PRESSURE: 114 MMHG | BODY MASS INDEX: 24.84 KG/M2 | DIASTOLIC BLOOD PRESSURE: 72 MMHG | WEIGHT: 135 LBS

## 2022-09-01 DIAGNOSIS — Z01.419 ENCOUNTER FOR GYNECOLOGICAL EXAMINATION (GENERAL) (ROUTINE) W/OUT ABNORMAL FINDINGS: ICD-10-CM

## 2022-09-01 DIAGNOSIS — R19.8 OTHER SPECIFIED SYMPTOMS AND SIGNS INVOLVING THE DIGESTIVE SYSTEM AND ABDOMEN: ICD-10-CM

## 2022-09-01 PROCEDURE — 99386 PREV VISIT NEW AGE 40-64: CPT

## 2022-09-01 PROCEDURE — 72195 MRI PELVIS W/O DYE: CPT | Mod: 26

## 2022-09-01 PROCEDURE — 72195 MRI PELVIS W/O DYE: CPT

## 2022-09-01 NOTE — HISTORY OF PRESENT ILLNESS
[Patient reported mammogram was normal] : Patient reported mammogram was normal [Patient reported PAP Smear was normal] : Patient reported PAP Smear was normal [HPV test offered] : HPV test offered [N] : Patient reports normal menses [Y] : Patient is sexually active [Hot Flashes] : hot flashes [Night Sweats] : night sweats [No] : none [TextBox_4] : 45 yo  for annual exam today.  Reports moderate vasomotor sx but does not want HRT . Reports stress incontinence. Scheduling surgery for uterine prolapse and inguinal hernia [Mammogramdate] : 05/22 [PapSmeardate] : 06/21 [ColonoscopyDate] : 10/22 [LMPDate] : 08/27/22 [MensesFreq] : 285 [PGHxTotal] : 2 [[facility] [[facility] : 2

## 2022-09-01 NOTE — PHYSICAL EXAM
[Appropriately responsive] : appropriately responsive [Alert] : alert [No Acute Distress] : no acute distress [No Lymphadenopathy] : no lymphadenopathy [Regular Rate Rhythm] : regular rate rhythm [No Murmurs] : no murmurs [Clear to Auscultation B/L] : clear to auscultation bilaterally [Oriented x3] : oriented x3 [Examination Of The Breasts] : a normal appearance [No Masses] : no breast masses were palpable [Labia Majora] : normal [Labia Minora] : normal [Uterine Prolapse] : uterine prolapse [Normal] : normal [Uterine Adnexae] : normal

## 2022-09-01 NOTE — DISCUSSION/SUMMARY
[FreeTextEntry1] : Unremarkable CBE and normal mammogram this year\par poelvic exam notable for uterine prolapse and surgery scheduled for this and inguinal hernia repair\par Pap and HPV collected\par Healthy diet, exercise, sleep hygiene discussed\par No other gyn concerns today\par RTO x 1 year or prn\par

## 2022-09-07 LAB — HPV HIGH+LOW RISK DNA PNL CVX: NOT DETECTED

## 2022-09-08 ENCOUNTER — APPOINTMENT (OUTPATIENT)
Dept: COLORECTAL SURGERY | Facility: CLINIC | Age: 47
End: 2022-09-08

## 2022-09-08 LAB — CYTOLOGY CVX/VAG DOC THIN PREP: NORMAL

## 2022-09-08 PROCEDURE — 99441: CPT

## 2022-09-08 NOTE — DATA REVIEWED
[FreeTextEntry1] : \par IMPRESSION:\par * Anatomic findings: Tears of the bilateral puborectalis along the pubic insertions.\par * Anterior compartment: Moderate cystocele with urethral hypermobility.\par * Middle compartment: Severe uterovaginal prolapse.\par * Levator hiatus and anorectal junction/perineal descent: Severely excessive widening and descent\par * Posterior compartment: Small anterior rectocele with contrast entrapment. Mild intrarectal intussusception.

## 2022-09-08 NOTE — HISTORY OF PRESENT ILLNESS
[Home] : at home, [unfilled] , at the time of the visit. [Medical Office: ([facility])___] : at the medical office located in  [Verbal consent obtained from patient] : the patient, [unfilled] [FreeTextEntry1] : 46-year-old female who presents for a telehealth visit for follow-up to review her MRI defecography.  She presented with and straining with bowel movements.  She has a known large cystocele and prolapse with prolapse as well as pelvic pain which improved with use of nortriptyline.  Her MRI showed small anterior rectocele with contrast entrapment as well as intrarectal intussusception.  She has tears in the bilateral pubertal pectoralis muscle and excessive widening and descent levator hiatus and anorectal junction.

## 2022-09-13 ENCOUNTER — NON-APPOINTMENT (OUTPATIENT)
Age: 47
End: 2022-09-13

## 2022-10-25 ENCOUNTER — APPOINTMENT (OUTPATIENT)
Dept: UROGYNECOLOGY | Facility: CLINIC | Age: 47
End: 2022-10-25

## 2022-10-25 DIAGNOSIS — R35.0 FREQUENCY OF MICTURITION: ICD-10-CM

## 2022-10-25 DIAGNOSIS — N81.2 INCOMPLETE UTEROVAGINAL PROLAPSE: ICD-10-CM

## 2022-10-25 DIAGNOSIS — N39.41 URGE INCONTINENCE: ICD-10-CM

## 2022-10-25 PROCEDURE — 99213 OFFICE O/P EST LOW 20 MIN: CPT

## 2022-10-25 NOTE — DISCUSSION/SUMMARY
[FreeTextEntry1] : \cate Del Cid presents for eval of POP/KASIA. She continues to desire surgery, we had a long discussion regarding robotic vs vaginal approaches, and mesh vs native tissue repairs. She is considering robotic reconstruction, she wants to hold off on surgery until the spring. Will plan for combined case with Gen Surg/CRS. All questions were answered.

## 2022-10-25 NOTE — HISTORY OF PRESENT ILLNESS
[Cystocele (Obstetric)] : no [Rectal Prolapse] : no [Stress Incontinence] : no [Urge Incontinence Of Urine] : no [Unable To Restrain Bowel Movement] : mild [Urinary Frequency] : no [Hematuria] : no [Urinary Frequency More Than Twice At Night (Nocturia)] : no nocturia [Feelings Of Urinary Urgency] : no [Pain During Urination (Dysuria)] : no [Urinary Tract Infection] : no [Constipation Obstructed Defecation] : no [] : no [Incomplete Emptying Of Stool] : no [Pelvic Pain] : no [Vaginal Pain] : no [FreeTextEntry1] : \par Maria Eugenia presents for f/u, she has POP/KASIA. \par She has seen CRS will do combined case for rectopexy\par Inguinal hernia will need repair at the same time\par UDS with positive KASIA \par \par she also has PGAD which is stable\par she does want surgery but wants to wait to the spring

## 2022-11-07 ENCOUNTER — APPOINTMENT (OUTPATIENT)
Dept: MRI IMAGING | Facility: CLINIC | Age: 47
End: 2022-11-07

## 2022-11-07 PROCEDURE — 73721 MRI JNT OF LWR EXTRE W/O DYE: CPT | Mod: LT

## 2022-11-30 ENCOUNTER — APPOINTMENT (OUTPATIENT)
Dept: SURGERY | Facility: HOSPITAL | Age: 47
End: 2022-11-30

## 2022-12-09 ENCOUNTER — APPOINTMENT (OUTPATIENT)
Dept: CT IMAGING | Facility: CLINIC | Age: 47
End: 2022-12-09

## 2023-06-14 ENCOUNTER — OUTPATIENT (OUTPATIENT)
Dept: OUTPATIENT SERVICES | Facility: HOSPITAL | Age: 48
LOS: 1 days | End: 2023-06-14
Payer: COMMERCIAL

## 2023-06-14 VITALS
TEMPERATURE: 98 F | SYSTOLIC BLOOD PRESSURE: 128 MMHG | RESPIRATION RATE: 14 BRPM | HEART RATE: 66 BPM | WEIGHT: 149.91 LBS | DIASTOLIC BLOOD PRESSURE: 82 MMHG | HEIGHT: 63 IN | OXYGEN SATURATION: 100 %

## 2023-06-14 DIAGNOSIS — Z90.3 ACQUIRED ABSENCE OF STOMACH [PART OF]: Chronic | ICD-10-CM

## 2023-06-14 DIAGNOSIS — Z90.49 ACQUIRED ABSENCE OF OTHER SPECIFIED PARTS OF DIGESTIVE TRACT: Chronic | ICD-10-CM

## 2023-06-14 DIAGNOSIS — K56.1 INTUSSUSCEPTION: ICD-10-CM

## 2023-06-14 DIAGNOSIS — Z98.890 OTHER SPECIFIED POSTPROCEDURAL STATES: Chronic | ICD-10-CM

## 2023-06-14 DIAGNOSIS — N81.2 INCOMPLETE UTEROVAGINAL PROLAPSE: ICD-10-CM

## 2023-06-14 DIAGNOSIS — K40.90 UNILATERAL INGUINAL HERNIA, WITHOUT OBSTRUCTION OR GANGRENE, NOT SPECIFIED AS RECURRENT: ICD-10-CM

## 2023-06-14 DIAGNOSIS — Z01.818 ENCOUNTER FOR OTHER PREPROCEDURAL EXAMINATION: ICD-10-CM

## 2023-06-14 DIAGNOSIS — Z29.9 ENCOUNTER FOR PROPHYLACTIC MEASURES, UNSPECIFIED: ICD-10-CM

## 2023-06-14 LAB
A1C WITH ESTIMATED AVERAGE GLUCOSE RESULT: 4.9 % — SIGNIFICANT CHANGE UP (ref 4–5.6)
ANION GAP SERPL CALC-SCNC: 13 MMOL/L — SIGNIFICANT CHANGE UP (ref 5–17)
APPEARANCE UR: CLEAR — SIGNIFICANT CHANGE UP
APTT BLD: 25.8 SEC — LOW (ref 27.5–35.5)
BACTERIA # UR AUTO: ABNORMAL
BASOPHILS # BLD AUTO: 0.04 K/UL — SIGNIFICANT CHANGE UP (ref 0–0.2)
BASOPHILS NFR BLD AUTO: 0.4 % — SIGNIFICANT CHANGE UP (ref 0–2)
BILIRUB UR-MCNC: NEGATIVE — SIGNIFICANT CHANGE UP
BLD GP AB SCN SERPL QL: SIGNIFICANT CHANGE UP
BUN SERPL-MCNC: 14.1 MG/DL — SIGNIFICANT CHANGE UP (ref 8–20)
CALCIUM SERPL-MCNC: 9.1 MG/DL — SIGNIFICANT CHANGE UP (ref 8.4–10.5)
CHLORIDE SERPL-SCNC: 102 MMOL/L — SIGNIFICANT CHANGE UP (ref 96–108)
CO2 SERPL-SCNC: 23 MMOL/L — SIGNIFICANT CHANGE UP (ref 22–29)
COLOR SPEC: YELLOW — SIGNIFICANT CHANGE UP
COMMENT - BLOOD BANK: SIGNIFICANT CHANGE UP
CREAT SERPL-MCNC: 0.8 MG/DL — SIGNIFICANT CHANGE UP (ref 0.5–1.3)
DIFF PNL FLD: ABNORMAL
EGFR: 91 ML/MIN/1.73M2 — SIGNIFICANT CHANGE UP
EOSINOPHIL # BLD AUTO: 0.08 K/UL — SIGNIFICANT CHANGE UP (ref 0–0.5)
EOSINOPHIL NFR BLD AUTO: 0.8 % — SIGNIFICANT CHANGE UP (ref 0–6)
EPI CELLS # UR: SIGNIFICANT CHANGE UP
ESTIMATED AVERAGE GLUCOSE: 94 MG/DL — SIGNIFICANT CHANGE UP (ref 68–114)
GLUCOSE SERPL-MCNC: 105 MG/DL — HIGH (ref 70–99)
GLUCOSE UR QL: NEGATIVE MG/DL — SIGNIFICANT CHANGE UP
HCG SERPL-ACNC: <4 MIU/ML — SIGNIFICANT CHANGE UP
HCT VFR BLD CALC: 39.6 % — SIGNIFICANT CHANGE UP (ref 34.5–45)
HGB BLD-MCNC: 13.4 G/DL — SIGNIFICANT CHANGE UP (ref 11.5–15.5)
IMM GRANULOCYTES NFR BLD AUTO: 0.3 % — SIGNIFICANT CHANGE UP (ref 0–0.9)
INR BLD: 1 RATIO — SIGNIFICANT CHANGE UP (ref 0.88–1.16)
KETONES UR-MCNC: NEGATIVE — SIGNIFICANT CHANGE UP
LEUKOCYTE ESTERASE UR-ACNC: NEGATIVE — SIGNIFICANT CHANGE UP
LYMPHOCYTES # BLD AUTO: 1.7 K/UL — SIGNIFICANT CHANGE UP (ref 1–3.3)
LYMPHOCYTES # BLD AUTO: 16.1 % — SIGNIFICANT CHANGE UP (ref 13–44)
MCHC RBC-ENTMCNC: 31.3 PG — SIGNIFICANT CHANGE UP (ref 27–34)
MCHC RBC-ENTMCNC: 33.8 GM/DL — SIGNIFICANT CHANGE UP (ref 32–36)
MCV RBC AUTO: 92.5 FL — SIGNIFICANT CHANGE UP (ref 80–100)
MONOCYTES # BLD AUTO: 0.73 K/UL — SIGNIFICANT CHANGE UP (ref 0–0.9)
MONOCYTES NFR BLD AUTO: 6.9 % — SIGNIFICANT CHANGE UP (ref 2–14)
MRSA PCR RESULT.: SIGNIFICANT CHANGE UP
NEUTROPHILS # BLD AUTO: 7.95 K/UL — HIGH (ref 1.8–7.4)
NEUTROPHILS NFR BLD AUTO: 75.5 % — SIGNIFICANT CHANGE UP (ref 43–77)
NITRITE UR-MCNC: NEGATIVE — SIGNIFICANT CHANGE UP
PH UR: 6 — SIGNIFICANT CHANGE UP (ref 5–8)
PLATELET # BLD AUTO: 246 K/UL — SIGNIFICANT CHANGE UP (ref 150–400)
POTASSIUM SERPL-MCNC: 4.2 MMOL/L — SIGNIFICANT CHANGE UP (ref 3.5–5.3)
POTASSIUM SERPL-SCNC: 4.2 MMOL/L — SIGNIFICANT CHANGE UP (ref 3.5–5.3)
PROT UR-MCNC: NEGATIVE — SIGNIFICANT CHANGE UP
PROTHROM AB SERPL-ACNC: 11.6 SEC — SIGNIFICANT CHANGE UP (ref 10.5–13.4)
RBC # BLD: 4.28 M/UL — SIGNIFICANT CHANGE UP (ref 3.8–5.2)
RBC # FLD: 13 % — SIGNIFICANT CHANGE UP (ref 10.3–14.5)
RBC CASTS # UR COMP ASSIST: SIGNIFICANT CHANGE UP /HPF (ref 0–4)
S AUREUS DNA NOSE QL NAA+PROBE: SIGNIFICANT CHANGE UP
SODIUM SERPL-SCNC: 138 MMOL/L — SIGNIFICANT CHANGE UP (ref 135–145)
SP GR SPEC: 1 — LOW (ref 1.01–1.02)
UROBILINOGEN FLD QL: NEGATIVE MG/DL — SIGNIFICANT CHANGE UP
WBC # BLD: 10.53 K/UL — HIGH (ref 3.8–10.5)
WBC # FLD AUTO: 10.53 K/UL — HIGH (ref 3.8–10.5)
WBC UR QL: SIGNIFICANT CHANGE UP /HPF (ref 0–5)

## 2023-06-14 PROCEDURE — G0463: CPT

## 2023-06-14 PROCEDURE — 93005 ELECTROCARDIOGRAM TRACING: CPT

## 2023-06-14 PROCEDURE — 93010 ELECTROCARDIOGRAM REPORT: CPT

## 2023-06-14 RX ORDER — SODIUM CHLORIDE 9 MG/ML
3 INJECTION INTRAMUSCULAR; INTRAVENOUS; SUBCUTANEOUS EVERY 8 HOURS
Refills: 0 | Status: DISCONTINUED | OUTPATIENT
Start: 2023-07-10 | End: 2023-07-12

## 2023-06-14 RX ORDER — GENTAMICIN SULFATE 40 MG/ML
260 VIAL (ML) INJECTION ONCE
Refills: 0 | Status: DISCONTINUED | OUTPATIENT
Start: 2023-07-10 | End: 2023-07-12

## 2023-06-14 NOTE — H&P PST ADULT - NSICDXFAMILYHX_GEN_ALL_CORE_FT
FAMILY HISTORY:  Father  Still living? No  FH: diabetes mellitus, Age at diagnosis: Age Unknown  FHx: hypertension, Age at diagnosis: Age Unknown    Mother  Still living? Yes, Estimated age: Age Unknown  FHx: hypertension, Age at diagnosis: Age Unknown    Sibling  Still living? Yes, Estimated age: Age Unknown  FH: multiple myeloma, Age at diagnosis: Age Unknown  FHx: hypertension, Age at diagnosis: Age Unknown

## 2023-06-14 NOTE — H&P PST ADULT - GASTROINTESTINAL
details… normal/soft/nontender/nondistended/normal active bowel sounds normal/soft/nontender/nondistended/normal active bowel sounds/no guarding/no rigidity/no organomegaly/no palpable augustin/no masses palpable

## 2023-06-14 NOTE — H&P PST ADULT - ASSESSMENT
Pt is a 47 years old female,  Via , LMP 23, seen today pre-op for Robotic Supracervical hysterectomy, sacrocolpopexy, midurethral sling cystoscopy by Dr. Loyd,  Robotic left inguinal hernia repair with MESH by Dr. Linton and Robotic laparoscopic possible open suture rectopexy and related procedures by Dr. Lozano.  Pt reports  vaginal bulging and  incontinence of bladder,  symptoms was first noted since  after her first child,  now progressively worsening. Pt also reports intermittent incontinence of bowel, symptoms now progressively worsening within the last 6months and Left hernia was noticed in , intermittent discomfort. No Fever/chills, no night sweat,  no change in appetite and no hematuria.   Seen today for a combined surgeries on 7/10/23  Pt is a 47 years old female,  Via , LMP 23, seen today pre-op for Robotic Supracervical hysterectomy, sacrocolpopexy, midurethral sling cystoscopy by Dr. Loyd,  Robotic left inguinal hernia repair with MESH by Dr. Linton and Robotic laparoscopic possible open suture rectopexy and related procedures by Dr. Daley.  Pt reports she sees tissue bulge out of the vagina, pulling in the pelvis region and  incontinence of bladder,  symptoms was first noted since  after her first child, now progressively worsening. Pt also reports intermittent incontinence of bowel, symptoms now progressively worsening within the last 6months and Left hernia was first noticed in ,  intermittent discomfort in the left groin region.  No Fever/chills, no night sweat,  no change in appetite and no hematuria. Pt medical hx and SX includes  left hip pain, posterior fossa  decompression , Gastric sleeve 2019Seen today for a combined surgeries on 7/10/23. Surgery protocol reviewed with Pt today. Pt to follow-up with PCP for medical clearance   CAPRINI VTE 2.0 SCORE [CLOT updated 2019]    AGE RELATED RISK FACTORS                                                       MOBILITY RELATED FACTORS  [ x] Age 41-60 years                                            (1 Point)                    [ ] Bed rest                                                        (1 Point)  [ ] Age: 61-74 years                                           (2 Points)                  [ ] Plaster cast                                                   (2 Points)  [ ] Age= 75 years                                              (3 Points)                    [ ] Bed bound for more than 72 hours                 (2 Points)    DISEASE RELATED RISK FACTORS                                               GENDER SPECIFIC FACTORS  [ ] Edema in the lower extremities                       (1 Point)              [ ] Pregnancy                                                     (1 Point)  [ x] Varicose veins                                               (1 Point)                     [ ] Post-partum < 6 weeks                                   (1 Point)             [x ] BMI > 25 Kg/m2                                            (1 Point)                     [ ] Hormonal therapy  or oral contraception          (1 Point)                 [ ] Sepsis (in the previous month)                        (1 Point)               [ ] History of pregnancy complications                 (1 point)  [ ] Pneumonia or serious lung disease                                               [ ] Unexplained or recurrent                     (1 Point)           (in the previous month)                               (1 Point)  [ ] Abnormal pulmonary function test                     (1 Point)                 SURGERY RELATED RISK FACTORS  [ ] Acute myocardial infarction                              (1 Point)               [ ]  Section                                             (1 Point)  [ ] Congestive heart failure (in the previous month)  (1 Point)      [ ] Minor surgery                                                  (1 Point)   [ ] Inflammatory bowel disease                             (1 Point)               [ ] Arthroscopic surgery                                        (2 Points)  [ ] Central venous access                                      (2 Points)                [x ] General surgery lasting more than 45 minutes (2 points)  [ ] Malignancy- Present or previous                   (2 Points)                [ ] Elective arthroplasty                                         (5 points)    [ ] Stroke (in the previous month)                          (5 Points)                                                                                                                                                           HEMATOLOGY RELATED FACTORS                                                 TRAUMA RELATED RISK FACTORS  [ ] Prior episodes of VTE                                     (3 Points)                [ ] Fracture of the hip, pelvis, or leg                       (5 Points)  [ ] Positive family history for VTE                         (3 Points)             [ ] Acute spinal cord injury (in the previous month)  (5 Points)  [ ] Prothrombin 85195 A                                     (3 Points)               [ ] Paralysis  (less than 1 month)                             (5 Points)  [ ] Factor V Leiden                                             (3 Points)                  [ ] Multiple Trauma within 1 month                        (5 Points)  [ ] Lupus anticoagulants                                     (3 Points)                                                           [ ] Anticardiolipin antibodies                               (3 Points)                                                       [ ] High homocysteine in the blood                      (3 Points)                                             [ ] Other congenital or acquired thrombophilia      (3 Points)                                                [ ] Heparin induced thrombocytopenia                  (3 Points)                                     Total Score [     5    ]    OPIOID RISK TOOL    YEN EACH BOX THAT APPLIES AND ADD TOTALS AT THE END    FAMILY HISTORY OF SUBSTANCE ABUSE                 FEMALE         MALE                                                Alcohol                             [  ]1 pt          [  ]3pts                                               Illegal Durgs                     [  ]2 pts        [  ]3pts                                               Rx Drugs                           [  ]4 pts        [  ]4 pts    PERSONAL HISTORY OF SUBSTANCE ABUSE                                                                                          Alcohol                             [  ]3 pts       [  ]3 pts                                               Illegal Drugs                     [  ]4 pts        [  ]4 pts                                               Rx Drugs                           [  ]5 pts        [  ]5 pts    AGE BETWEEN 16-45 YEARS                                      [  ]1 pt         [  ]1 pt    HISTORY OF PREADOLESCENT   SEXUAL ABUSE                                                             [  ]3 pts        [  ]0pts    PSYCHOLOGICAL DISEASE                     ADD, OCD, Bipolar, Schizophrenia        [  ]2 pts         [  ]2 pts                      Depression                                               [  ]1 pt           [  ]1 pt           SCORING TOTAL   (add numbers and type here)              (*0**)                                     A score of 3 or lower indicated LOW risk for future opioid abuse  A score of 4 to 7 indicated moderate risk for future opioid abuse  A score of 8 or higher indicates a high risk for opioid abuse

## 2023-06-14 NOTE — H&P PST ADULT - PROBLEM SELECTOR PLAN 2
Robotic Supracervical hysterectomy, sacrocolpopexy, midurethral sling cystoscopy by Dr. Loyd, Pt to follow-up with PCP for medical clearance

## 2023-06-14 NOTE — H&P PST ADULT - RESPIRATORY
Pt's father is wanting neuro psychological testing done  Mood disorders, anxiety, ADHD, depression, seizure, epilepsy  3 drug overdoses over the last year with some concern for brain damage  Does have note from psychiatrist, Dr. Montrell Craven with [facility],  Stating: neuro psychological testing to clarify diagnosis and treatment recommendations    Please advise   normal/clear to auscultation bilaterally/no wheezes/no rales/no rhonchi

## 2023-06-14 NOTE — H&P PST ADULT - NSICDXPASTSURGICALHX_GEN_ALL_CORE_FT
PAST SURGICAL HISTORY:  History of appendectomy     S/P gastric sleeve procedure      PAST SURGICAL HISTORY:  History of appendectomy     S/P gastric sleeve procedure     Status post phlebectomy

## 2023-06-14 NOTE — H&P PST ADULT - REASON FOR ADMISSION
" partial hysterectomy with bladder lift, Dr. Donohue  Left inguinal hernia repair and  DR SANTOYO doing rectal plasty " partial hysterectomy with bladder lift by Dr. Loyd, Dr. Donohue Left inguinal hernia repair and  Dr. Lozano doing rectopexy" " partial hysterectomy with bladder lift by Dr. Loyd, Dr. Linton Left inguinal hernia repair and  Dr. Daley doing rectopexy"

## 2023-06-14 NOTE — H&P PST ADULT - PROBLEM SELECTOR PLAN 3
Robotic laparoscopic possible open suture rectopexy and related procedures by Dr. Daley.  Pt to follow-up with PCP for medical clearance

## 2023-06-14 NOTE — H&P PST ADULT - PSYCHIATRIC
[facility] AT [address]  Phone: [phone] Fax: [phone]  PROGRESS NOTE  Patient Name: Irais Aguilar : 1984   Treatment/Medical Diagnosis: Neck pain [M54.2]  Gait instability [R26.81]  Multiple sclerosis [G35]   Referral Source: Rika Acosta NP     Date of Initial Visit: 3/1/22 Attended Visits: 15 Missed Visits: 0     SUMMARY OF TREATMENT  Patient is a 44 y.o. male who presents with complaints of neck pain, back pain, right LE pain, dizziness/vertigo, imbalance and difficulty with functional mobility/gait. Treatment has consisted of: therapeutic exercise to improve LE/lumbar strength/mobility; therapeutic activities to improve transfer/lift/carry ability; neuromuscular re-education to improve balance, core stability, neuromuscular control with lifting; PNF patterns; physical agent/modality for symptom management; manual therapy for symptom relief and muscle flexibility; patient education to improve symptom management; self Care training; home safety training; stair training, and functional mobility training. CURRENT STATUS  Patient has attended PT for 16 sessions for the treatment of impaired gait/mobility. The patient con't to demonstrate small but significant indicators of progress at this time, likely due to prolonged history of MS diagnosis and progressive nature of this condition. He demo's improved confidence in gait, balance and in-home mobility. He does still have little carry over with gait training despite frequent efforts in the clinic. Pt con't to be limited by dizziness and fatigue; he has not had to use his cooling vest this season yet. Pain is not a major factor in care, aside from mm spasms in the R thigh / adductors that produce c/o 3/10 pain. The patient will continue to benefit from skilled therapy to address continued restrictions impaired ambulation independence/endurance. Additional assessment includes:    Subjective Gains: decreased reliance on cane for walking, improved walking pattern, improved overall endurance, improved ambulation distances, use of rollator with chair for very long distances; balance and coordination (But still needs improvement)   Subjective Deficits: increased muscle tone and spasms in right thigh (chiefly adductors/quadriceps), dizziness provoked with certain activities (sitting down washing dishes; quick sit to stand t/f); using shower chair most of the time; balance/coordination not at goal level yet.   % improvement: 60%  Pain   Average: 0/10 ave; max 3/10 pain described as mm spasms and tightness in the LE's, intermittent needles/parestheias feeling   Patient Goal: \"better with everything, make lower body strong\"     Objective Measures:   LE Strength:    Right (/5) Left (/5)   Hip     Flexion 4 5             Abduction 4 5             Adduction  NT NT              Extension 3+ 4   Knee   Extension 4 5              Flexion 4 5      Gait: continues to demonstrate decreased left step length L > R.  Step-to pattern on the L with walking stick in right hand. Little reciprocity of gait observed with absent (B) heel strike even with extensive cuing to initiate. Wide base of support with R > L LE externally rotated . Initiates gait with R Foot and step to with L LE. Unable to coordinate gait initiation with the L LE.  Demo's ability to step through on the L (approx 1 foot length past the R) with extensive cuing and R UE Assist.   Stairs:  (B) UE assist, ER of the R LE; non-reciprocal. Ascends wit the R preferred (can ascending with the L but 2x difficulty noted; increased UE assist and momentum/vaulting  Bed mobility: rolling with min increased time; supine to sit: increased time required but I  Transitional positions: achieves QP position and can tolerate rhythmic stabs; achieves tall kneeling with UE assist on Thighs; 1/2 kneeling: unable to achieve position Sit to stand t/f: able to perform without UE but prefers UE assist;  wide ROSIE     2MWT - NT (dizziness limited)   6MWT - 564', holding SPC in hands but not utilizing it; 2 rest breaks (standing)  Balance:   mCTSIB  EO Firm 30\" normal sway  EC Firm 30\" normal sway   EO Foam: NT today (time constraints)  EC Foam: NT today (time)      -TUG Time without AD- 22\" no   UE assist required for sit to stand;   30 second  6\" step test:  1 UE assist on railing (L); 13x   -Tinetti - 14/28 pts       Current Goals:  1. Patient will demonstrate independence with updated HEP. PN: requires future progression  Current: 4/26/22: encouraged quadruped activity on days w/ inc dizziness or inc mm spasms of the LE to allow for safe movement     2. Patient will score greater than or equal to 16/28 on Tinetti to indicate improved balance/decreased fall risk. PN: Tinetti 10/28 pts  Current: goal progressing but not met; 14/28 (4/28/22)     3. Patient will demonstrate stance eyes closed 30\" without UE support to increase safety with ADLs.    PN: 18\"  Current: normal ROSIE (feet hips width) with no AD or UE support today 30\" EC (4/28/22)     4. Patient will demo 6MWT of at least 500 feet with LRAD without dizziness to improve ease with community ambulation.   PN: 440', significant dizziness due to fatigue  Current: 564 ft without AD , normalized gait deviations 4/21/2022     5. Patient will perform TUG test in 20 seconds or less without AD to improve safety with household mobility. PN: 32\"   Current: goal progressing at 22\" no AD; gait deviations as noted above (4/28/22)      New Goals to be achieved in _4-8__  treatments:  1. Patient will demonstrate independence with updated HEP. PN: encouraged quadruped activity on days w/ inc dizziness or inc mm spasms of the LE to allow for safe movement     2. Patient will score greater than or equal to 16/28 on Tinetti to indicate improved balance/decreased fall risk. PN: Tinetti 14/28     4.  Patient will demo 6MWT of at least 500 feet with LRAD without dizziness to improve ease with community ambulation.   PN:  564 ft without AD , gait deviations 4/21/2022     5. Patient will perform TUG test in 20 seconds or less without AD to improve safety with household mobility. PN:  22\" no AD; gait deviations as noted above    RECOMMENDATIONS  Pt to continue with skilled therapy for 1-2x/week (ideally progressing to 1x/week with increased HEP compliance) for 4-8 sessions to improve static/dynamic standing balance, increase independence with ambulation, decrease future burden for caregiver, and improve ease with household chores. If you have any questions/comments please contact us directly at [phone]   Thank you for allowing us to assist in the care of your patient. Therapist Signature: Reema Bragg PT Date: 4/28/2022   Reporting Period  3/31/22 to 4/28/22 Time: 10:26 AM   NOTE TO PHYSICIAN:  PLEASE COMPLETE THE ORDERS BELOW AND FAX TO   [facility]: [phone]. If you are unable to process this request in 24 hours please contact our office: [phone].  ___ I have read the above report and request that my patient continue as recommended.   ___ I have read the above report and request that my patient continue therapy with the following changes/special instructions:_________________________________________________________   ___ I have read the above report and request that my patient be discharged from therapy.      Physician Signature:        Date:       Time:                                                        Luis Boothe NP. negative normal/normal affect/alert and oriented x3/normal behavior

## 2023-06-14 NOTE — H&P PST ADULT - NSICDXPASTMEDICALHX_GEN_ALL_CORE_FT
PAST MEDICAL HISTORY:  History of intussusception     Incomplete uterovaginal prolapse     Pain in left hip      PAST MEDICAL HISTORY:  Frequency of micturition     History of intussusception     Incomplete uterovaginal prolapse     Pain in left hip     Unilateral inguinal hernia

## 2023-06-14 NOTE — H&P PST ADULT - NSANTHOSAYNRD_GEN_A_CORE
No. BEAR screening performed.  STOP BANG Legend: 0-2 = LOW Risk; 3-4 = INTERMEDIATE Risk; 5-8 = HIGH Risk

## 2023-06-14 NOTE — H&P PST ADULT - HISTORY OF PRESENT ILLNESS
Pt is a 47 years old female seen today pre-op for Robotic Supracervical hysterectomy, sacrocolpopexy, midurethral sling cystoscopy, Robotic left inguinal hernia repair with MESH, Robotic laparoscopic possible open suture rectopexy and related procedures. 5  Pt is a 47 years old female,  Via , LMP 23, seen today pre-op for Robotic Supracervical hysterectomy, sacrocolpopexy, midurethral sling cystoscopy, Robotic left inguinal hernia repair with MESH, Robotic laparoscopic possible open suture rectopexy and related procedures. Pt  reports  bladder symptoms noted since , after her fist child, sumptoms now progresively worsening, rectal sympones noted about 6 years and now progressively worsng with the lst 6months, Left hernia was noticed in  Pt is a 47 years old female,  Via , LMP 23, seen today pre-op for Robotic Supracervical hysterectomy, sacrocolpopexy, midurethral sling cystoscopy by Dr. Loyd,  Robotic left inguinal hernia repair with MESH by Dr. Linton and Robotic laparoscopic possible open suture rectopexy and related procedures by Dr. Lozano.  Pt reports  vaginal bulging and  incontinence of bladder,  symptoms was first noted since  after her first child,  now progressively worsening. Pt also reports intermittent incontinence of bowel, symptoms now progressively worsening within the last 6months and Left hernia was noticed in , intermittent discomfort. No Fever/chills, no night sweat,  no change in appetite and no hematuria.   Seen today for a combined surgeries on 7/10/23   Pt is a 47 years old female,  Via , LMP 23, seen today pre-op for Robotic Supracervical hysterectomy, sacrocolpopexy, midurethral sling cystoscopy by Dr. Loyd,  Robotic left inguinal hernia repair with MESH by Dr. Linton and Robotic laparoscopic possible open suture rectopexy and related procedures by Dr. Daley.  Pt reports she sees tissue bulge out of the vagina, pulling in the pelvis region and  incontinence of bladder,  symptoms was first noted since  after her first child, now progressively worsening. Pt also reports intermittent incontinence of bowel, symptoms now progressively worsening within the last 6months and Left hernia was first noticed in ,  intermittent discomfort in the left groin region.  No Fever/chills, no night sweat,  no change in appetite and no hematuria. Pt medical hx and SX includes  left hip pain, posterior fossa  decompression , Gastric sleeve 2019Seen today for a combined surgeries on 7/10/23

## 2023-06-14 NOTE — H&P PST ADULT - PROBLEM SELECTOR PLAN 4
Robotic left inguinal hernia repair with MESH by Dr. Linton, Pt to follow-up with PCP for medical clearance

## 2023-06-15 LAB
CULTURE RESULTS: SIGNIFICANT CHANGE UP
SPECIMEN SOURCE: SIGNIFICANT CHANGE UP

## 2023-06-20 ENCOUNTER — APPOINTMENT (OUTPATIENT)
Dept: UROGYNECOLOGY | Facility: CLINIC | Age: 48
End: 2023-06-20
Payer: COMMERCIAL

## 2023-06-20 DIAGNOSIS — N81.4 UTEROVAGINAL PROLAPSE, UNSPECIFIED: ICD-10-CM

## 2023-06-20 DIAGNOSIS — N39.3 STRESS INCONTINENCE (FEMALE) (MALE): ICD-10-CM

## 2023-06-20 PROCEDURE — 99214 OFFICE O/P EST MOD 30 MIN: CPT

## 2023-06-20 NOTE — DISCUSSION/SUMMARY
[FreeTextEntry1] : Maria Eugenia presents with POP/KASIA/rectocele/inguinal hernia\par \par \par The patient presented to the office today for counseling regarding her decision for possible pelvic reconstructive surgery. All pertinent prior studies including urodynamic testing were reviewed. 						\par The patient was counseled regarding alternative non-surgical therapies as well as the prognosis with no intervention.\par \par The patient was advised regarding various surgical options including abdominal, robotic/laparoscopic and vaginal approaches. The risks and benefits of surgery using endogenous tissue only versus the use of graft insertion (mesh) were fully reviewed.  She was informed of the potential for improved durability and the inherent risk of graft use including but not limited to infection, erosion and chronic inflammation, acute and chronic pain, pain with intercourse (both of which may be refractory to treatment) fistula, cervical elongation, disturbance in bowel or bladder function, any of which may require additional surgery for mesh revision.  She is aware that the mesh used in her surgery is a permanent mesh.  The patient was advised regarding the July 2011 FDA notification regarding these issues and provided the website address for further reference www.fda.gov.  \par \par The general risks of the surgery were reviewed including, but not limited to infection, bleeding, including transfusion, surrounding organ or tissue injury (bladder, rectum, bowel, urethra, ureters, nerves vessels or muscles), failure meaning recurrent prolapse and/or continued leaking, voiding dysfunction, needing to go home with a catheter, pain with sex, blood clots, and anesthesia.\par \par The approximate length of the surgery, hospital stay and postoperative recovery period were reviewed, including a general overview of convalescence and postoperative follow-up.\par \par The patient is aware that 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  Appropriate informed consent was obtained.  All questions were answered to the patient’s satisfaction.\par \par IUGA SCP/MUS given\par \par [] consent for pelvic EUA, robotic MADELINE/BS/SCP/sling/cysto, possible a/p repair, possible laparotomy, any other indicated procedures \par

## 2023-06-20 NOTE — HISTORY OF PRESENT ILLNESS
[Cystocele (Obstetric)] : no [Rectal Prolapse] : no [Stress Incontinence] : no [Urge Incontinence Of Urine] : no [Unable To Restrain Bowel Movement] : mild [Urinary Frequency] : no [Hematuria] : no [Urinary Frequency More Than Twice At Night (Nocturia)] : no nocturia [Feelings Of Urinary Urgency] : no [Pain During Urination (Dysuria)] : no [Urinary Tract Infection] : no [] : no [Constipation Obstructed Defecation] : no [Incomplete Emptying Of Stool] : no [Pelvic Pain] : no [Vaginal Pain] : no [FreeTextEntry1] : Maria Eugenia presents for eval of POP/KASIA\par \par PAP/HPV negative\par UDS positive KASIA/capacity\par MRI def with mild intrarectal intussusception \par \par sonogram not done yet

## 2023-06-20 NOTE — PHYSICAL EXAM
[Chaperone Present] : A chaperone was present in the examining room during all aspects of the physical examination [FreeTextEntry1] : General: Well, appearing. Alert and orientated. No acute distress\par HEENT: Normocephalic, atraumatic and extraocular muscles appear to be intact \par Neck: Full range of motion, no obvious lymphadenopathy, deformities, or masses noted \par Respiratory: Speaking in full sentences comfortably, normal work of breathing and no cough during visit\par Musculoskeletal: active full range of motion in extremities \par Extremities: No upper extremity edema noted\par Skin: no obvious rash or skin lesions\par Neuro: Orientated X 3, speech is fluent, normal rate\par Psych: Normal mood and affect \par \par  [Aa ____] : Aa [unfilled] [Ba ____] : Ba [unfilled] [C ____] : C [unfilled] [GH ____] : GH [unfilled] [PB ____] : PB [unfilled] [TVL ____] : TVL  [unfilled] [Ap ____] : Ap [unfilled] [Bp ____] : Bp [unfilled] [D ____] : D [unfilled] [Normal] : no abnormalities [Post Void Residual ____ml] : post void residual was [unfilled] ml

## 2023-06-26 PROBLEM — R35.0 FREQUENCY OF MICTURITION: Chronic | Status: ACTIVE | Noted: 2023-06-14

## 2023-06-26 PROBLEM — N81.2 INCOMPLETE UTEROVAGINAL PROLAPSE: Chronic | Status: ACTIVE | Noted: 2023-06-14

## 2023-06-26 PROBLEM — M25.552 PAIN IN LEFT HIP: Chronic | Status: ACTIVE | Noted: 2023-06-14

## 2023-06-26 PROBLEM — K40.90 UNILATERAL INGUINAL HERNIA, WITHOUT OBSTRUCTION OR GANGRENE, NOT SPECIFIED AS RECURRENT: Chronic | Status: ACTIVE | Noted: 2023-06-14

## 2023-06-26 PROBLEM — Z87.19 PERSONAL HISTORY OF OTHER DISEASES OF THE DIGESTIVE SYSTEM: Chronic | Status: ACTIVE | Noted: 2023-06-14

## 2023-06-28 ENCOUNTER — APPOINTMENT (OUTPATIENT)
Dept: ULTRASOUND IMAGING | Facility: CLINIC | Age: 48
End: 2023-06-28
Payer: COMMERCIAL

## 2023-06-28 PROCEDURE — 76856 US EXAM PELVIC COMPLETE: CPT

## 2023-06-28 PROCEDURE — 76830 TRANSVAGINAL US NON-OB: CPT

## 2023-07-09 ENCOUNTER — TRANSCRIPTION ENCOUNTER (OUTPATIENT)
Age: 48
End: 2023-07-09

## 2023-07-09 ENCOUNTER — NON-APPOINTMENT (OUTPATIENT)
Age: 48
End: 2023-07-09

## 2023-07-10 ENCOUNTER — TRANSCRIPTION ENCOUNTER (OUTPATIENT)
Age: 48
End: 2023-07-10

## 2023-07-10 ENCOUNTER — RESULT REVIEW (OUTPATIENT)
Age: 48
End: 2023-07-10

## 2023-07-10 ENCOUNTER — APPOINTMENT (OUTPATIENT)
Dept: UROGYNECOLOGY | Facility: HOSPITAL | Age: 48
End: 2023-07-10

## 2023-07-10 ENCOUNTER — INPATIENT (INPATIENT)
Facility: HOSPITAL | Age: 48
LOS: 1 days | Discharge: HOME | DRG: 742 | End: 2023-07-12
Attending: STUDENT IN AN ORGANIZED HEALTH CARE EDUCATION/TRAINING PROGRAM | Admitting: STUDENT IN AN ORGANIZED HEALTH CARE EDUCATION/TRAINING PROGRAM
Payer: COMMERCIAL

## 2023-07-10 ENCOUNTER — APPOINTMENT (OUTPATIENT)
Dept: SURGERY | Facility: HOSPITAL | Age: 48
End: 2023-07-10

## 2023-07-10 ENCOUNTER — APPOINTMENT (OUTPATIENT)
Dept: COLORECTAL SURGERY | Facility: HOSPITAL | Age: 48
End: 2023-07-10
Payer: COMMERCIAL

## 2023-07-10 VITALS
HEART RATE: 73 BPM | HEIGHT: 63 IN | WEIGHT: 149.91 LBS | OXYGEN SATURATION: 100 % | TEMPERATURE: 99 F | SYSTOLIC BLOOD PRESSURE: 126 MMHG | RESPIRATION RATE: 16 BRPM | DIASTOLIC BLOOD PRESSURE: 63 MMHG

## 2023-07-10 DIAGNOSIS — Z98.890 OTHER SPECIFIED POSTPROCEDURAL STATES: Chronic | ICD-10-CM

## 2023-07-10 DIAGNOSIS — Z90.49 ACQUIRED ABSENCE OF OTHER SPECIFIED PARTS OF DIGESTIVE TRACT: Chronic | ICD-10-CM

## 2023-07-10 DIAGNOSIS — K40.90 UNILATERAL INGUINAL HERNIA, WITHOUT OBSTRUCTION OR GANGRENE, NOT SPECIFIED AS RECURRENT: ICD-10-CM

## 2023-07-10 DIAGNOSIS — Z90.3 ACQUIRED ABSENCE OF STOMACH [PART OF]: Chronic | ICD-10-CM

## 2023-07-10 LAB — BLD GP AB SCN SERPL QL: SIGNIFICANT CHANGE UP

## 2023-07-10 PROCEDURE — 49650 LAP ING HERNIA REPAIR INIT: CPT | Mod: AS

## 2023-07-10 PROCEDURE — 58542 LSH W/T/O UT 250 G OR LESS: CPT

## 2023-07-10 PROCEDURE — 45400 LAPAROSCOPIC PROC: CPT

## 2023-07-10 PROCEDURE — S2900 ROBOTIC SURGICAL SYSTEM: CPT | Mod: NC

## 2023-07-10 PROCEDURE — 49650 LAP ING HERNIA REPAIR INIT: CPT

## 2023-07-10 PROCEDURE — 57280 SUSPENSION OF VAGINA: CPT | Mod: 82

## 2023-07-10 PROCEDURE — 88307 TISSUE EXAM BY PATHOLOGIST: CPT | Mod: 26

## 2023-07-10 PROCEDURE — 57425 LAPAROSCOPY SURG COLPOPEXY: CPT

## 2023-07-10 PROCEDURE — 57288 REPAIR BLADDER DEFECT: CPT

## 2023-07-10 DEVICE — SLING TRANSVAGINAL MID-URETHRAL ADVANTAGE FIT: Type: IMPLANTABLE DEVICE | Status: FUNCTIONAL

## 2023-07-10 DEVICE — MESH HERNIA INGUINAL PROGRIP LAPAROSCOPIC 15 X 10CM LEFT: Type: IMPLANTABLE DEVICE | Status: FUNCTIONAL

## 2023-07-10 DEVICE — MESH UPSYLON Y: Type: IMPLANTABLE DEVICE | Status: FUNCTIONAL

## 2023-07-10 RX ORDER — CELECOXIB 200 MG/1
400 CAPSULE ORAL ONCE
Refills: 0 | Status: COMPLETED | OUTPATIENT
Start: 2023-07-10 | End: 2023-07-10

## 2023-07-10 RX ORDER — ONDANSETRON 8 MG/1
4 TABLET, FILM COATED ORAL ONCE
Refills: 0 | Status: DISCONTINUED | OUTPATIENT
Start: 2023-07-10 | End: 2023-07-10

## 2023-07-10 RX ORDER — CHOLECALCIFEROL (VITAMIN D3) 125 MCG
2 CAPSULE ORAL
Refills: 0 | DISCHARGE

## 2023-07-10 RX ORDER — ACETAMINOPHEN 500 MG
1 TABLET ORAL
Qty: 56 | Refills: 0
Start: 2023-07-10 | End: 2023-07-23

## 2023-07-10 RX ORDER — GABAPENTIN 400 MG/1
300 CAPSULE ORAL EVERY 8 HOURS
Refills: 0 | Status: DISCONTINUED | OUTPATIENT
Start: 2023-07-10 | End: 2023-07-12

## 2023-07-10 RX ORDER — ASCORBIC ACID 60 MG
3 TABLET,CHEWABLE ORAL
Refills: 0 | DISCHARGE

## 2023-07-10 RX ORDER — SODIUM CHLORIDE 9 MG/ML
1000 INJECTION, SOLUTION INTRAVENOUS
Refills: 0 | Status: DISCONTINUED | OUTPATIENT
Start: 2023-07-10 | End: 2023-07-12

## 2023-07-10 RX ORDER — ACETAMINOPHEN 500 MG
975 TABLET ORAL EVERY 6 HOURS
Refills: 0 | Status: DISCONTINUED | OUTPATIENT
Start: 2023-07-10 | End: 2023-07-12

## 2023-07-10 RX ORDER — ENOXAPARIN SODIUM 100 MG/ML
40 INJECTION SUBCUTANEOUS EVERY 24 HOURS
Refills: 0 | Status: DISCONTINUED | OUTPATIENT
Start: 2023-07-10 | End: 2023-07-12

## 2023-07-10 RX ORDER — POLYETHYLENE GLYCOL 3350 17 G/17G
17 POWDER, FOR SOLUTION ORAL AT BEDTIME
Refills: 0 | Status: DISCONTINUED | OUTPATIENT
Start: 2023-07-10 | End: 2023-07-12

## 2023-07-10 RX ORDER — ONDANSETRON 8 MG/1
8 TABLET, FILM COATED ORAL EVERY 8 HOURS
Refills: 0 | Status: DISCONTINUED | OUTPATIENT
Start: 2023-07-10 | End: 2023-07-12

## 2023-07-10 RX ORDER — OXYCODONE HYDROCHLORIDE 5 MG/1
10 TABLET ORAL EVERY 4 HOURS
Refills: 0 | Status: DISCONTINUED | OUTPATIENT
Start: 2023-07-10 | End: 2023-07-12

## 2023-07-10 RX ORDER — ACETAMINOPHEN 500 MG
1000 TABLET ORAL ONCE
Refills: 0 | Status: COMPLETED | OUTPATIENT
Start: 2023-07-10 | End: 2023-07-10

## 2023-07-10 RX ORDER — NORTRIPTYLINE HYDROCHLORIDE 10 MG/1
1 CAPSULE ORAL
Refills: 0 | DISCHARGE

## 2023-07-10 RX ORDER — CALCIUM CARBONATE 500(1250)
0 TABLET ORAL
Refills: 0 | DISCHARGE

## 2023-07-10 RX ORDER — MAGNESIUM OXIDE 400 MG ORAL TABLET 241.3 MG
1 TABLET ORAL
Refills: 0 | DISCHARGE

## 2023-07-10 RX ORDER — METRONIDAZOLE 500 MG
500 TABLET ORAL ONCE
Refills: 0 | Status: COMPLETED | OUTPATIENT
Start: 2023-07-10 | End: 2023-07-10

## 2023-07-10 RX ORDER — SIMETHICONE 80 MG/1
80 TABLET, CHEWABLE ORAL EVERY 6 HOURS
Refills: 0 | Status: DISCONTINUED | OUTPATIENT
Start: 2023-07-10 | End: 2023-07-12

## 2023-07-10 RX ORDER — OXYCODONE HYDROCHLORIDE 5 MG/1
5 TABLET ORAL
Refills: 0 | Status: DISCONTINUED | OUTPATIENT
Start: 2023-07-10 | End: 2023-07-12

## 2023-07-10 RX ORDER — ACETAMINOPHEN 500 MG
1000 TABLET ORAL EVERY 6 HOURS
Refills: 0 | Status: DISCONTINUED | OUTPATIENT
Start: 2023-07-10 | End: 2023-07-12

## 2023-07-10 RX ORDER — SODIUM CHLORIDE 9 MG/ML
1000 INJECTION, SOLUTION INTRAVENOUS
Refills: 0 | Status: DISCONTINUED | OUTPATIENT
Start: 2023-07-10 | End: 2023-07-10

## 2023-07-10 RX ORDER — FERROUS SULFATE 325(65) MG
1 TABLET ORAL
Refills: 0 | DISCHARGE

## 2023-07-10 RX ORDER — ACETAMINOPHEN 500 MG
975 TABLET ORAL ONCE
Refills: 0 | Status: COMPLETED | OUTPATIENT
Start: 2023-07-10 | End: 2023-07-10

## 2023-07-10 RX ORDER — GABAPENTIN 400 MG/1
1 CAPSULE ORAL
Qty: 5 | Refills: 0
Start: 2023-07-10 | End: 2023-07-14

## 2023-07-10 RX ORDER — HYDROMORPHONE HYDROCHLORIDE 2 MG/ML
0.5 INJECTION INTRAMUSCULAR; INTRAVENOUS; SUBCUTANEOUS
Refills: 0 | Status: DISCONTINUED | OUTPATIENT
Start: 2023-07-10 | End: 2023-07-10

## 2023-07-10 RX ADMIN — ENOXAPARIN SODIUM 40 MILLIGRAM(S): 100 INJECTION SUBCUTANEOUS at 22:26

## 2023-07-10 RX ADMIN — GABAPENTIN 300 MILLIGRAM(S): 400 CAPSULE ORAL at 22:26

## 2023-07-10 RX ADMIN — Medication 975 MILLIGRAM(S): at 23:50

## 2023-07-10 RX ADMIN — SODIUM CHLORIDE 100 MILLILITER(S): 9 INJECTION, SOLUTION INTRAVENOUS at 23:52

## 2023-07-10 RX ADMIN — GABAPENTIN 300 MILLIGRAM(S): 400 CAPSULE ORAL at 14:42

## 2023-07-10 RX ADMIN — Medication 200 MILLIGRAM(S): at 08:11

## 2023-07-10 RX ADMIN — Medication 400 MILLIGRAM(S): at 12:30

## 2023-07-10 RX ADMIN — Medication 975 MILLIGRAM(S): at 17:29

## 2023-07-10 RX ADMIN — POLYETHYLENE GLYCOL 3350 17 GRAM(S): 17 POWDER, FOR SOLUTION ORAL at 22:26

## 2023-07-10 NOTE — BRIEF OPERATIVE NOTE - NSICDXBRIEFPREOP_GEN_ALL_CORE_FT
PRE-OP DIAGNOSIS:  Rectal prolapse 10-Jul-2023 11:08:24  Olivia Daley  
PRE-OP DIAGNOSIS:  Uterine prolapse 10-Jul-2023 11:50:49  Marietta Stallings  Urinary, incontinence, stress female 10-Jul-2023 11:50:40  Marietta Stallings  
PRE-OP DIAGNOSIS:  Inguinal hernia 10-Jul-2023 11:32:15  Beth Blackmon  Left inguinal hernia 10-Jul-2023 11:32:27  Beth Blackmon

## 2023-07-10 NOTE — DISCHARGE NOTE PROVIDER - PROVIDER TOKENS
PROVIDER:[TOKEN:[[id number]],FOLLOWUP:[2 weeks]],PROVIDER:[TOKEN:[[id number]]],PROVIDER:[TOKEN:[[id number]]]

## 2023-07-10 NOTE — BRIEF OPERATIVE NOTE - NSICDXBRIEFPOSTOP_GEN_ALL_CORE_FT
POST-OP DIAGNOSIS:  Rectal prolapse 10-Jul-2023 11:08:33  Olivia Daley  
POST-OP DIAGNOSIS:  Urinary, incontinence, stress female 10-Jul-2023 11:51:03  Marietta Stallings  Uterine prolapse 10-Jul-2023 11:51:01  Marietta Stallings  
POST-OP DIAGNOSIS:  Left inguinal hernia 10-Jul-2023 11:32:49  Beth Blackmon

## 2023-07-10 NOTE — DISCHARGE NOTE PROVIDER - NSDCFUSCHEDAPPT_GEN_ALL_CORE_FT
Olivia Daley O  [facility]  COLOSURG [address]  Scheduled Appointment: 07/10/2023    [facility]  UROGYN [address]  Scheduled Appointment: 07/25/2023    [facility]  UROGYN [address]  Scheduled Appointment: 08/24/2023     [facility]  UROGYN [address]  Scheduled Appointment: 07/25/2023    [facility]  UROGYN [address]  Scheduled Appointment: 08/24/2023

## 2023-07-10 NOTE — DISCHARGE NOTE PROVIDER - NSDCCPTREATMENT_GEN_ALL_CORE_FT
PRINCIPAL PROCEDURE  Procedure: Robot-assisted laparoscopic supracervical hysterectomy with cystoscopy  Findings and Treatment:       SECONDARY PROCEDURE  Procedure: Robot-assisted sacrocolpopexy with mesh  Findings and Treatment:     Procedure: Robot-assisted rectopexy using suture  Findings and Treatment:     Procedure: Repair, hernia, inguinal, robot-assisted  Findings and Treatment:

## 2023-07-10 NOTE — DISCHARGE NOTE PROVIDER - NSDCMRMEDTOKEN_GEN_ALL_CORE_FT
calcium carbonate 400 mg oral tablet, chewable: orally 2 times a day  cholecalciferol 25 mcg (1000 intl units) oral tablet: 2 tab(s) orally once a day  ferrous sulfate 325 mg (65 mg elemental iron) oral tablet: 1 orally  gabapentin 300 mg oral capsule: 1 cap(s) orally once a day as needed for  moderate pain  magnesium oxide 400 mg oral tablet: 1 orally 3 times a day  nortriptyline 10 mg oral capsule: 1 orally once a day  Tylenol 325 mg oral capsule: 1 cap(s) orally every 6 hours  Vitamin C 500 mg oral tablet, chewable: 3 tab(s) chewed

## 2023-07-10 NOTE — DISCHARGE NOTE PROVIDER - CARE PROVIDER_API CALL
Lana Loyd  Obstetrics and Gynecology  [address], [address]  [place] [address]  Phone: [phone]  Fax: [phone]  Follow Up Time: 2 weeks    Gurinder Linton  Surgery  [address] [place] [address]  Phone: [phone]  Fax: [phone]  Follow Up Time:     Olivia Daley  Colon/Rectal Surgery  [address], [address] [place] [address]  Phone: [phone]  Fax: [phone]  Follow Up Time:

## 2023-07-10 NOTE — CHART NOTE - NSCHARTNOTEFT_GEN_A_CORE
LEONIE MELGAR is a 47y now POD#0 s/p RA-MADELINE, BS, SCP, MUS, left inguinal hernia repair, rectopexy, cystoscopy.     S:    No acute events postoperatively.  Patient was seen and examined at bedside.   Patient reporting moderate abdominal cramping and lower back pain. She has not received additional medications outside of PACU yet. Receiving gabapentin during evaluation.   Tolerating clear liquid diet, denies N/V.   Has not tried ambulating  - flatus/-BM/Vo catheter in place  She denies lightheadedness, dizziness, palpitations, chest pain and SOB.     O:   T(C): 36.3 (07-10-23 @ 13:30), Max: 37.1 (07-10-23 @ 06:51)  HR: 71 (07-10-23 @ 13:30) (70 - 77)  BP: 104/69 (07-10-23 @ 13:30) (104/69 - 126/63)  RR: 18 (07-10-23 @ 13:30) (12 - 18)  SpO2: 97% (07-10-23 @ 13:30) (97% - 100%)    Gen: NAD, AAOx3  CV: RRR, S1 S2 present  Pulm: CTAB  Abdomen: Soft, nondistended, appropriately tender, + BS   Pelvic: minimal blood on pad; vo draining clear-yellow urine  Incision: Clean, dry and intact with dermabond overlying  Extremities: No calf tenderness or edema           A/P: LEONIE MELGAR is a 47y now POD#0 s/p RA-MADELINE, BS, SCP, MUS, left inguinal hernia repair, rectopexy, cystoscopy.     Neuro: Pain moderately controlled. Continue to assess pain after gabapentin. PRN oxycodone ordered if patient requires stronger pain medication. Continue current pain regimen.  CV: No history of cardiovascular disease. Blood pressure well controlled.  Pulm: No active disease. Saturating well on room air.   GI: No active disease. Bowel sounds and function normal, tolerating PO liquid diet. Advance diet. Continue current bowel regimen.   : Vo to be removed after an active TOV in the AM  Heme: Hgb 13.4 -> AM labs ordered  ID: S/p ancef and flagyl intraoperatively. Afebrile. No antibiotics indicated at this time.   Endo: No active disease.   FEN: IVF at 100cc/h. Will discontinue IVF when tolerating PO. Electrolytes WNL. AM labs ordered.  Skin: No active disease.   Psych: No active disease.   DVT ppx: Ambulation encouraged, SCDs when in bed, lovenox ordered.  Dispo: Pending labs and AM rounds.

## 2023-07-10 NOTE — BRIEF OPERATIVE NOTE - OPERATION/FINDINGS
Abd entered using Veress and ports placed under direct visualization. left sided Hernia defect (direct/indirect) identified and reduced. Peritoneal flap created and mesh placed in anatomically appropriate position. Flap closure using 3.0 Vloc.

## 2023-07-10 NOTE — BRIEF OPERATIVE NOTE - COMMENTS
wound class I from MIS standpoint  patient left in stable condition for Urogyn and CRS to continue their portions of the case

## 2023-07-10 NOTE — BRIEF OPERATIVE NOTE - NSICDXBRIEFPROCEDURE_GEN_ALL_CORE_FT
PROCEDURES:  Robot-assisted laparoscopic supracervical hysterectomy with cystoscopy 10-Jul-2023 11:49:59  Marietta Stallings  Robot-assisted sacrocolpopexy with mesh 10-Jul-2023 11:50:17  Marietta Stallings  Robot-assisted bilateral salpingectomy 10-Jul-2023 11:50:28  Marietta Stallings   PROCEDURES:  Robot-assisted laparoscopic supracervical hysterectomy with cystoscopy 10-Jul-2023 11:49:59  Marietta Stallings  Robot-assisted sacrocolpopexy with mesh 10-Jul-2023 11:50:17  Marietta Stallings  Robot-assisted bilateral salpingectomy 10-Jul-2023 11:50:28  Marietta Stallings  Creation of mid urethral sling in female 10-Jul-2023 11:55:03  Marietta Stallings

## 2023-07-10 NOTE — BRIEF OPERATIVE NOTE - OPERATION/FINDINGS
Normal uterus, tubes, bilateral ovaries. Anterior/apical/posterior prolapse that was significantly improved s/p repair. Abdomen insufflated with veres needle. Rest of the robotic trocars introduced under direct visualization. Pelvic survey revealed a normal uterus, tubes, and bilateral ovaries. Anterior/apical/posterior prolapse that was significantly improved s/p repair. Abdomen insufflated with veres needle. Rest of the robotic trocars introduced under direct visualization. Pelvic survey revealed a normal uterus, tubes, and bilateral ovaries. Anterior/apical/posterior prolapse that was significantly improved s/p repair. Mid uretheral sling placed vaginally.

## 2023-07-10 NOTE — DISCHARGE NOTE PROVIDER - HOSPITAL COURSE
Patient is s/p a RA-MADELINE, BS, SCP, MUS, cysto, left inguinal hernia repair, rectopexy admitted for routine post-operative care. Progressing well in her post-operative course. Pain well controlled on oral pain medications. Labs and vitals wnl on discharge.

## 2023-07-10 NOTE — DISCHARGE NOTE PROVIDER - NSDCCPCAREPLAN_GEN_ALL_CORE_FT
PRINCIPAL DISCHARGE DIAGNOSIS  Diagnosis: Incomplete uterovaginal prolapse  Assessment and Plan of Treatment:       SECONDARY DISCHARGE DIAGNOSES  Diagnosis: Intussusception  Assessment and Plan of Treatment:     Diagnosis: Left inguinal hernia  Assessment and Plan of Treatment:

## 2023-07-11 LAB
ANION GAP SERPL CALC-SCNC: 11 MMOL/L — SIGNIFICANT CHANGE UP (ref 5–17)
BUN SERPL-MCNC: 7.2 MG/DL — LOW (ref 8–20)
CALCIUM SERPL-MCNC: 7.9 MG/DL — LOW (ref 8.4–10.5)
CHLORIDE SERPL-SCNC: 104 MMOL/L — SIGNIFICANT CHANGE UP (ref 96–108)
CO2 SERPL-SCNC: 24 MMOL/L — SIGNIFICANT CHANGE UP (ref 22–29)
CREAT SERPL-MCNC: 0.66 MG/DL — SIGNIFICANT CHANGE UP (ref 0.5–1.3)
EGFR: 108 ML/MIN/1.73M2 — SIGNIFICANT CHANGE UP
GLUCOSE SERPL-MCNC: 92 MG/DL — SIGNIFICANT CHANGE UP (ref 70–99)
HCT VFR BLD CALC: 35.6 % — SIGNIFICANT CHANGE UP (ref 34.5–45)
HGB BLD-MCNC: 12.1 G/DL — SIGNIFICANT CHANGE UP (ref 11.5–15.5)
MCHC RBC-ENTMCNC: 31.3 PG — SIGNIFICANT CHANGE UP (ref 27–34)
MCHC RBC-ENTMCNC: 34 GM/DL — SIGNIFICANT CHANGE UP (ref 32–36)
MCV RBC AUTO: 92 FL — SIGNIFICANT CHANGE UP (ref 80–100)
PLATELET # BLD AUTO: 202 K/UL — SIGNIFICANT CHANGE UP (ref 150–400)
POTASSIUM SERPL-MCNC: 3.8 MMOL/L — SIGNIFICANT CHANGE UP (ref 3.5–5.3)
POTASSIUM SERPL-SCNC: 3.8 MMOL/L — SIGNIFICANT CHANGE UP (ref 3.5–5.3)
RBC # BLD: 3.87 M/UL — SIGNIFICANT CHANGE UP (ref 3.8–5.2)
RBC # FLD: 12.7 % — SIGNIFICANT CHANGE UP (ref 10.3–14.5)
SODIUM SERPL-SCNC: 139 MMOL/L — SIGNIFICANT CHANGE UP (ref 135–145)
WBC # BLD: 7.46 K/UL — SIGNIFICANT CHANGE UP (ref 3.8–10.5)
WBC # FLD AUTO: 7.46 K/UL — SIGNIFICANT CHANGE UP (ref 3.8–10.5)

## 2023-07-11 PROCEDURE — 93970 EXTREMITY STUDY: CPT | Mod: 26

## 2023-07-11 RX ADMIN — GABAPENTIN 300 MILLIGRAM(S): 400 CAPSULE ORAL at 20:58

## 2023-07-11 RX ADMIN — GABAPENTIN 300 MILLIGRAM(S): 400 CAPSULE ORAL at 15:19

## 2023-07-11 RX ADMIN — ENOXAPARIN SODIUM 40 MILLIGRAM(S): 100 INJECTION SUBCUTANEOUS at 23:36

## 2023-07-11 RX ADMIN — OXYCODONE HYDROCHLORIDE 5 MILLIGRAM(S): 5 TABLET ORAL at 05:45

## 2023-07-11 RX ADMIN — OXYCODONE HYDROCHLORIDE 5 MILLIGRAM(S): 5 TABLET ORAL at 05:03

## 2023-07-11 RX ADMIN — Medication 975 MILLIGRAM(S): at 12:12

## 2023-07-11 RX ADMIN — Medication 975 MILLIGRAM(S): at 23:36

## 2023-07-11 RX ADMIN — POLYETHYLENE GLYCOL 3350 17 GRAM(S): 17 POWDER, FOR SOLUTION ORAL at 20:58

## 2023-07-11 RX ADMIN — OXYCODONE HYDROCHLORIDE 5 MILLIGRAM(S): 5 TABLET ORAL at 13:12

## 2023-07-11 RX ADMIN — Medication 975 MILLIGRAM(S): at 06:20

## 2023-07-11 RX ADMIN — SODIUM CHLORIDE 3 MILLILITER(S): 9 INJECTION INTRAMUSCULAR; INTRAVENOUS; SUBCUTANEOUS at 05:49

## 2023-07-11 RX ADMIN — GABAPENTIN 300 MILLIGRAM(S): 400 CAPSULE ORAL at 06:20

## 2023-07-11 RX ADMIN — OXYCODONE HYDROCHLORIDE 5 MILLIGRAM(S): 5 TABLET ORAL at 12:12

## 2023-07-11 RX ADMIN — Medication 975 MILLIGRAM(S): at 18:27

## 2023-07-11 NOTE — CHART NOTE - NSCHARTNOTEFT_GEN_A_CORE
Notified patient reporting new onset left leg pain. On evaluation patient reporting aching deep pain in her left thigh and calf posteriorly. Denies similar sensation before. Able to ambulate and sensation intact. Mild tenderness to palpation. No erythema or swelling in the leg. No shortness of breath, chest pain, palpitations. Given recent surgery and relative immobilization, lower extremity dopplers ordered to rule out DVT. Clinically low suspicion. Patient's discharge pending negative dopplers.

## 2023-07-11 NOTE — PROGRESS NOTE ADULT - SUBJECTIVE AND OBJECTIVE BOX
INTERVAL HPI/OVERNIGHT EVENTS: Admitted s/p extensive surgery for pain management. Started on regular diet.     STATUS POST: RA-MADELINE, BS, SCP, MUS, left inguinal hernia repair, rectopexy, cystoscopy.     POST OPERATIVE DAY #: 1    SUBJECTIVE: Pain is appropriately controlled. Has had minimal PO intake 2/2 reduced appetite without N/V.   Flatus: [ ] YES [ x] NO             Bowel Movement: [ ] YES [ x] NO  Pain Control Adequate: [x ] YES [ ] NO  Nausea: [ ] YES [x ] NO            Vomiting: [ ] YES [x ] NO     Chest Pain: [ ] YES [x ] NO    SOB:  [ ] YES [x ] NO      Vital Signs Last 24 Hrs  T(C): 36.9 (11 Jul 2023 03:29), Max: 36.9 (10 Jul 2023 20:00)  T(F): 98.4 (11 Jul 2023 03:29), Max: 98.4 (10 Jul 2023 20:00)  HR: 74 (11 Jul 2023 03:29) (70 - 89)  BP: 132/77 (11 Jul 2023 03:29) (104/69 - 132/77)  BP(mean): --  RR: 18 (11 Jul 2023 03:29) (12 - 18)  SpO2: 97% (11 Jul 2023 03:29) (97% - 100%)    Parameters below as of 10 Jul 2023 13:30  Patient On (Oxygen Delivery Method): room air        PHYSICAL EXAM:  Constitutional: NAD, awake and alert, resting comfortably in bed  Respiratory: No increased work of breathing, no conversational dyspnea  Gastrointestinal: Abdomen soft, appropriately tender to palpation, surgical wounds with dermabond C/D/I  Extremities: Warm well perfused, no edema      I&O's Detail    10 Jul 2023 07:01  -  11 Jul 2023 07:00  --------------------------------------------------------  IN:    Instillation (mL): 350 mL    Lactated Ringers: 1500 mL  Total IN: 1850 mL    OUT:    Instillation (mL): 250 mL    Ureteral Catheter (mL): 2000 mL  Total OUT: 2250 mL    Total NET: -400 mL

## 2023-07-11 NOTE — PROGRESS NOTE ADULT - SUBJECTIVE AND OBJECTIVE BOX
INTERVAL HPI/OVERNIGHT EVENTS: Admitted s/p extensive surgery for pain management. Started on regular diet.     STATUS POST: RA-MADELINE, BS, SCP, MUS, left inguinal hernia repair, rectopexy, cystoscopy.     POST OPERATIVE DAY #: 1    SUBJECTIVE: Pain is appropriately controlled. Has had minimal PO intake 2/2 reduced appetite without N/V.   Flatus: [ ] YES [ x] NO             Bowel Movement: [ ] YES [ x] NO  Pain Control Adequate: [x ] YES [ ] NO  Nausea: [ ] YES [x ] NO            Vomiting: [ ] YES [x ] NO     Chest Pain: [ ] YES [x ] NO    SOB:  [ ] YES [x ] NO      Vital Signs Last 24 Hrs  T(C): 36.9 (11 Jul 2023 03:29), Max: 36.9 (10 Jul 2023 20:00)  T(F): 98.4 (11 Jul 2023 03:29), Max: 98.4 (10 Jul 2023 20:00)  HR: 74 (11 Jul 2023 03:29) (70 - 89)  BP: 132/77 (11 Jul 2023 03:29) (104/69 - 132/77)  BP(mean): --  RR: 18 (11 Jul 2023 03:29) (12 - 18)  SpO2: 97% (11 Jul 2023 03:29) (97% - 100%)    Parameters below as of 10 Jul 2023 13:30  Patient On (Oxygen Delivery Method): room air        PHYSICAL EXAM:  Constitutional: NAD, awake and alert, resting comfortably in bed  Respiratory: No increased work of breathing, no conversational dyspnea  Gastrointestinal: Abdomen soft, appropriately tender to palpation, surgical wounds with dermabond C/D/I  Extremities: Warm well perfused, no edema      I&O's Detail    10 Jul 2023 07:01  -  11 Jul 2023 07:00  --------------------------------------------------------  IN:    Instillation (mL): 350 mL    Lactated Ringers: 1500 mL  Total IN: 1850 mL    OUT:    Instillation (mL): 250 mL    Ureteral Catheter (mL): 2000 mL  Total OUT: 2250 mL    Total NET: -400 mL     INTERVAL HPI/OVERNIGHT EVENTS: Admitted s/p extensive surgery for pain management. Started on regular diet.     STATUS POST: RA-MADELINE, BS, SCP, MUS, left inguinal hernia repair, rectopexy, cystoscopy.     POST OPERATIVE DAY #: 1    SUBJECTIVE: Pain is appropriately controlled. Has had minimal PO intake 2/2 reduced appetite without N/V. She has been up and ambulating without issue.  Flatus: [ ] YES [ x] NO             Bowel Movement: [ ] YES [ x] NO  Pain Control Adequate: [x ] YES [ ] NO  Nausea: [ ] YES [x ] NO            Vomiting: [ ] YES [x ] NO     Chest Pain: [ ] YES [x ] NO    SOB:  [ ] YES [x ] NO      Vital Signs Last 24 Hrs  T(C): 36.9 (11 Jul 2023 03:29), Max: 36.9 (10 Jul 2023 20:00)  T(F): 98.4 (11 Jul 2023 03:29), Max: 98.4 (10 Jul 2023 20:00)  HR: 74 (11 Jul 2023 03:29) (70 - 89)  BP: 132/77 (11 Jul 2023 03:29) (104/69 - 132/77)  BP(mean): --  RR: 18 (11 Jul 2023 03:29) (12 - 18)  SpO2: 97% (11 Jul 2023 03:29) (97% - 100%)    Parameters below as of 10 Jul 2023 13:30  Patient On (Oxygen Delivery Method): room air        PHYSICAL EXAM:  Constitutional: NAD, awake and alert, resting comfortably in bed  Respiratory: No increased work of breathing, no conversational dyspnea  Gastrointestinal: Abdomen soft, appropriately tender to palpation, surgical wounds with dermabond C/D/I  Extremities: Warm well perfused, no edema      I&O's Detail    10 Jul 2023 07:01  -  11 Jul 2023 07:00  --------------------------------------------------------  IN:    Instillation (mL): 350 mL    Lactated Ringers: 1500 mL  Total IN: 1850 mL    OUT:    Instillation (mL): 250 mL    Ureteral Catheter (mL): 2000 mL  Total OUT: 2250 mL    Total NET: -400 mL

## 2023-07-11 NOTE — PROGRESS NOTE ADULT - ASSESSMENT
ASSESSMENT/PLAN   47y Female POD 1 s/p RA-MADELINE, BS, SCP, MUS, left inguinal hernia repair, rectopexy, cystoscopy.     - Can follow up outpatient with Dr. Daley in 2 weeks post op  -  ASSESSMENT/PLAN   47y Female POD 1 s/p RA-MADELINE, BS, SCP, MUS, left inguinal hernia repair, rectopexy, cystoscopy.     - Can follow up outpatient with Dr. Daley in 2 weeks post op  - Please do not lift >10lbs for 4-6 weeks

## 2023-07-11 NOTE — PROGRESS NOTE ADULT - ASSESSMENT
LEONIE MELGAR is a 47y now POD#1 s/p RA-MADELINE, BS, SCP, MUS, left inguinal hernia repair, rectopexy, cystoscopy.    Neuro: Pain well controlled. Continue current pain regimen.  CV: No history of cardiovascular disease. Blood pressure well controlled.  Pulm: No active disease. Saturating well on room air. Incentive spirometer use encouraged  GI: No active disease. Bowel sounds and function normal, tolerating PO diet. Continue current bowel regimen.   : Osborne removed, passed aTOV. voiding spontaneously.  Heme: Hgb 13.4 -> AM labs pending  ID: Afebrile. No antibiotics indicated at this time.   Endo: No active disease.   FEN: IDiscontinue IVF as patient is tolerating PO. Electrolytes WNL pre-operatively. AM labs pending.   Skin: No active disease.   Psych: No active disease.     DVT ppx: Ambulation encouraged, SCDs when in bed, lovenox ordered.    Dispo: Pending labs and attending AM rounds.

## 2023-07-11 NOTE — PROGRESS NOTE ADULT - SUBJECTIVE AND OBJECTIVE BOX
LEONIE MELGAR is a 47y now POD#1 s/p RA-MADELINE, BS, SCP, MUS, left inguinal hernia repair, rectopexy, cystoscopy    S:    No acute events overnight.   Patient was seen and examined at bedside.   Patient has no complaints this AM.   Pain is well controlled with current treatment regimen.   Tolerating regular diet, denies N/V.   Ambulating without difficulty.   + flatus/-BM/+ voiding ([id number] 250 out)  She denies lightheadedness, dizziness, palpitations, chest pain and SOB.     O:   T(C): 36.9 (07-11-23 @ 03:29), Max: 36.9 (07-10-23 @ 20:00)  HR: 74 (07-11-23 @ 03:29) (70 - 89)  BP: 132/77 (07-11-23 @ 03:29) (104/69 - 132/77)  RR: 18 (07-11-23 @ 03:29) (12 - 18)  SpO2: 97% (07-11-23 @ 03:29) (97% - 100%)    Gen: NAD, AAOx3  CV: RRR, S1 S2 present  Pulm: CTAB  Abdomen: Soft, nondistended, appropriately tender, + BS   Pelvic: Pad inspected with minimal amount spotting  Incision: Clean, dry and intact with dermabond; expected bruising  Extremities: No calf tenderness or edema     Labs:       07-10-23 @ 07:01  -  07-11-23 @ 06:52  --------------------------------------------------------  IN: 1850 mL / OUT: 2250 mL / NET: -400 mL

## 2023-07-11 NOTE — PROGRESS NOTE ADULT - ASSESSMENT
ASSESSMENT/PLAN   47y Female POD 1 s/p RA-MADELINE, BS, SCP, MUS, left inguinal hernia repair, rectopexy, cystoscopy.     - Can follow up outpatient with Dr. Linton in 2 weeks post op  - Please do not lift >10lbs for 4-6 weeks  - Do not submerge in water at least until time of follow up

## 2023-07-12 ENCOUNTER — TRANSCRIPTION ENCOUNTER (OUTPATIENT)
Age: 48
End: 2023-07-12

## 2023-07-12 VITALS
DIASTOLIC BLOOD PRESSURE: 71 MMHG | RESPIRATION RATE: 18 BRPM | TEMPERATURE: 98 F | OXYGEN SATURATION: 98 % | HEART RATE: 84 BPM | SYSTOLIC BLOOD PRESSURE: 115 MMHG

## 2023-07-12 LAB — SURGICAL PATHOLOGY STUDY: SIGNIFICANT CHANGE UP

## 2023-07-12 PROCEDURE — S2900: CPT

## 2023-07-12 PROCEDURE — 85027 COMPLETE CBC AUTOMATED: CPT

## 2023-07-12 PROCEDURE — C1771: CPT

## 2023-07-12 PROCEDURE — 86901 BLOOD TYPING SEROLOGIC RH(D): CPT

## 2023-07-12 PROCEDURE — 80048 BASIC METABOLIC PNL TOTAL CA: CPT

## 2023-07-12 PROCEDURE — 88307 TISSUE EXAM BY PATHOLOGIST: CPT

## 2023-07-12 PROCEDURE — 36415 COLL VENOUS BLD VENIPUNCTURE: CPT

## 2023-07-12 PROCEDURE — 86900 BLOOD TYPING SEROLOGIC ABO: CPT

## 2023-07-12 PROCEDURE — 93970 EXTREMITY STUDY: CPT

## 2023-07-12 PROCEDURE — C1781: CPT

## 2023-07-12 PROCEDURE — 86850 RBC ANTIBODY SCREEN: CPT

## 2023-07-12 RX ADMIN — Medication 975 MILLIGRAM(S): at 11:43

## 2023-07-12 RX ADMIN — SIMETHICONE 80 MILLIGRAM(S): 80 TABLET, CHEWABLE ORAL at 08:44

## 2023-07-12 RX ADMIN — GABAPENTIN 300 MILLIGRAM(S): 400 CAPSULE ORAL at 05:22

## 2023-07-12 RX ADMIN — Medication 975 MILLIGRAM(S): at 05:22

## 2023-07-12 NOTE — PROGRESS NOTE ADULT - SUBJECTIVE AND OBJECTIVE BOX
SURGERY    STATUS POST:  RA-MADELINE, BS, SCP, MUS, left inguinal hernia repair, rectopexy, cystoscopy.    POST OPERATIVE DAY #2      INTERVAL EVENTS/SUBJECTIVE: Admitted s/p extensive surgery for pain management. Currently on a regular diet but still has not passed gas.      ______________________________________________  OBJECTIVE:   T(C): 36.5 (07-12-23 @ 05:30), Max: 37.1 (07-12-23 @ 00:06)  HR: 68 (07-12-23 @ 05:30) (68 - 82)  BP: 121/82 (07-12-23 @ 05:30) (102/68 - 132/82)  RR: 18 (07-12-23 @ 05:30) (18 - 18)  SpO2: 99% (07-12-23 @ 05:30) (98% - 99%)  Wt(kg): --  CAPILLARY BLOOD GLUCOSE        I&O's Detail    11 Jul 2023 07:01  -  12 Jul 2023 07:00  --------------------------------------------------------  IN:  Total IN: 0 mL    OUT:    Voided (mL): 100 mL  Total OUT: 100 mL    Total NET: -100 mL          Physical exam:  Gen: resting in bed comfortably in NAD  Chest: no increased WOB, regular inspiratory effort   Abdomen: Soft, nontender, nondistended with no rebound tenderness or guarding.   Vascular: WWP, CH x4  NEURO: awake, alert  ______________________________________________  LABS:  CBC Full  -  ( 11 Jul 2023 07:10 )  WBC Count : 7.46 K/uL  RBC Count : 3.87 M/uL  Hemoglobin : 12.1 g/dL  Hematocrit : 35.6 %  Platelet Count - Automated : 202 K/uL  Mean Cell Volume : 92.0 fl  Mean Cell Hemoglobin : 31.3 pg  Mean Cell Hemoglobin Concentration : 34.0 gm/dL  Auto Neutrophil # : x  Auto Lymphocyte # : x  Auto Monocyte # : x  Auto Eosinophil # : x  Auto Basophil # : x  Auto Neutrophil % : x  Auto Lymphocyte % : x  Auto Monocyte % : x  Auto Eosinophil % : x  Auto Basophil % : x    07-11    139  |  104  |  7.2<L>  ----------------------------<  92  3.8   |  24.0  |  0.66    Ca    7.9<L>      11 Jul 2023 07:10         SURGERY    STATUS POST:  RA-MADELINE, BS, SCP, MUS, left inguinal hernia repair, rectopexy, cystoscopy.    POST OPERATIVE DAY #2      INTERVAL EVENTS/SUBJECTIVE: Admitted s/p extensive surgery for pain management. Currently on a regular diet but still has not passed gas. Tolerating regular diet, denies N/V.  Abdominal pain is slightly improved.      ______________________________________________  OBJECTIVE:   T(C): 36.5 (07-12-23 @ 05:30), Max: 37.1 (07-12-23 @ 00:06)  HR: 68 (07-12-23 @ 05:30) (68 - 82)  BP: 121/82 (07-12-23 @ 05:30) (102/68 - 132/82)  RR: 18 (07-12-23 @ 05:30) (18 - 18)  SpO2: 99% (07-12-23 @ 05:30) (98% - 99%)  Wt(kg): --  CAPILLARY BLOOD GLUCOSE        I&O's Detail    11 Jul 2023 07:01  -  12 Jul 2023 07:00  --------------------------------------------------------  IN:  Total IN: 0 mL    OUT:    Voided (mL): 100 mL  Total OUT: 100 mL    Total NET: -100 mL          Physical exam:  Gen: resting in bed comfortably in NAD  Chest: no increased WOB, regular inspiratory effort   Abdomen: Soft, nontender, nondistended with no rebound tenderness or guarding.   Vascular: WWP, CH x4  NEURO: awake, alert  ______________________________________________  LABS:  CBC Full  -  ( 11 Jul 2023 07:10 )  WBC Count : 7.46 K/uL  RBC Count : 3.87 M/uL  Hemoglobin : 12.1 g/dL  Hematocrit : 35.6 %  Platelet Count - Automated : 202 K/uL  Mean Cell Volume : 92.0 fl  Mean Cell Hemoglobin : 31.3 pg  Mean Cell Hemoglobin Concentration : 34.0 gm/dL  Auto Neutrophil # : x  Auto Lymphocyte # : x  Auto Monocyte # : x  Auto Eosinophil # : x  Auto Basophil # : x  Auto Neutrophil % : x  Auto Lymphocyte % : x  Auto Monocyte % : x  Auto Eosinophil % : x  Auto Basophil % : x    07-11    139  |  104  |  7.2<L>  ----------------------------<  92  3.8   |  24.0  |  0.66    Ca    7.9<L>      11 Jul 2023 07:10

## 2023-07-12 NOTE — PROGRESS NOTE ADULT - ASSESSMENT
LEONIE MELGAR is a 47y now POD#2 s/p RA-MADELINE, BS, SCP, MUS, left inguinal hernia repair, rectopexy, cystoscopy    Neuro: Pain well controlled. Continue current pain regimen.  CV: No history of cardiovascular disease. Blood pressure well controlled.  Pulm: No active disease. Saturating well on room air. Incentive spirometer use encouraged  GI: No active disease. Bowel sounds wnl. Still awaiting return of bowel function. Denies any gas pain. Encourage ambulation, peppermint tea, chewing gum to help return of function. Tolerating PO diet. Continue current bowel regimen.   : Osborne removed, voiding spontaneous  Heme: Hgb 13.4 ->12.1.    ID: Afebrile. No antibiotics indicated at this time.   Endo: No active disease.   FEN: Tolerating PO fluids. Electrolytes WNL  Skin: No active disease.   Psych: No active disease.     DVT ppx: Lower extremity dopplers negative for DVT. Ambulation encouraged, SCDs when in bed, lovenox ordered.    Dispo: Pending attending AM rounds and return of bowel function.

## 2023-07-12 NOTE — PROGRESS NOTE ADULT - ASSESSMENT
47y Female POD 2 s/p RA-MADELINE, BS, SCP, MUS, left inguinal hernia repair, rectopexy, cystoscopy    PLAN  - Surgery team will continue to monitor until the patient passes flatus  - Can follow up outpatient with Dr. Linton (ACS) in 2 weeks post op  - Can follow up outpatient with Dr. Daley (colorectal) in 2 weeks post op  - Please do not lift >10lbs for 4-6 weeks  - Do not submerge in water at least until time of follow up      Gilberto Mahajan MD  General Surgery Resident 47y Female POD 2 s/p RA-MADELINE, BS, SCP, MUS, left inguinal hernia repair, rectopexy, cystoscopy    PLAN  - Clear to discharge from a surgical perspective  - Can follow up outpatient with Dr. Linton (ACS) in 2 weeks post op  - Can follow up outpatient with Dr. Daley (colorectal) in 2 weeks post op  - Please do not lift >10lbs for 4-6 weeks  - Do not submerge in water at least until time of follow up      Gilberto Mahajan MD  General Surgery Resident

## 2023-07-12 NOTE — PROGRESS NOTE ADULT - SUBJECTIVE AND OBJECTIVE BOX
LEONIE MELGAR is a 47y now POD#2 s/p RA-MADELINE, BS, SCP, MUS, left inguinal hernia repair, rectopexy, cystoscopy    S:    No acute events overnight.   Patient was seen and examined at bedside.   Still reporting left leg pain this morning; however, states is is tolerable.  Pain is well controlled with current treatment regimen.   Tolerating regular diet, denies N/V.   Ambulating without difficulty.   - flatus/-BM/+ voiding  She denies lightheadedness, dizziness, palpitations, chest pain and SOB.     O:   T(C): 36.5 (07-12-23 @ 05:30), Max: 37.1 (07-12-23 @ 00:06)  HR: 68 (07-12-23 @ 05:30) (68 - 82)  BP: 121/82 (07-12-23 @ 05:30) (102/68 - 132/82)  RR: 18 (07-12-23 @ 05:30) (18 - 18)  SpO2: 99% (07-12-23 @ 05:30) (98% - 99%)    Gen: NAD, AAOx3  Abdomen: Soft, nondistended, appropriately tender  Pelvic: Pad inspected with minimal amount of dark red blood   Incision: Clean, dry and intact  Extremities: No calf tenderness or edema     Labs:       07-11-23 @ 07:01  -  07-12-23 @ 07:00  --------------------------------------------------------  IN: 0 mL / OUT: 100 mL / NET: -100 mL

## 2023-07-12 NOTE — DISCHARGE NOTE NURSING/CASE MANAGEMENT/SOCIAL WORK - PATIENT PORTAL LINK FT
You can access the FollowMyHealth Patient Portal offered by [facility] by registering at the following website: [email or web address]. By joining [email or web address]’s FollowMyHealth portal, you will also be able to view your health information using other applications (apps) compatible with our system.

## 2023-07-12 NOTE — PROGRESS NOTE ADULT - ATTENDING COMMENTS
Patient seen and examined. Agree with above. Voiding, good pain control. Discharge home today.
pt seen and examined  POD # 1 s/p robotic MADELINE/BS/SCP/sling/cysto, rectopexy, hernia repair  doing well  tolerating diet   voiding but unsure if emptying   will check PVR  labs stable, VSS stable    plan for d/c home today, all questions were answered.
Patient seen and examined at bedside. She is doing well, reports some soreness, but pain is controlled. Tolerating a regular diet, denies N/V, denies bowel function. Abdomen is soft, ND, AT, binder in place. Follow up bowel function. Discharge home today once having bowel function.    Vital Signs Last 24 Hrs  T(C): 36.7 (11 Jul 2023 07:41), Max: 36.9 (10 Jul 2023 20:00)  T(F): 98 (11 Jul 2023 07:41), Max: 98.4 (10 Jul 2023 20:00)  HR: 74 (11 Jul 2023 07:41) (74 - 89)  BP: 132/82 (11 Jul 2023 07:41) (112/72 - 132/82)  BP(mean): --  RR: 18 (11 Jul 2023 07:41) (18 - 18)  SpO2: 98% (11 Jul 2023 07:41) (97% - 98%)                          12.1   7.46  )-----------( 202      ( 11 Jul 2023 07:10 )             35.6
Patient seen and examined at bedside. She is doing well, up and out of bed, reports some soreness, pain is controlled. Tolerating a regular diet, denies N/V, having bowel function. Abdomen is soft, ND, AT, binder in place. Ok for discharge from CRS point of view.    Vital Signs Last 24 Hrs  T(C): 36.7 (12 Jul 2023 08:38), Max: 37.1 (12 Jul 2023 00:06)  T(F): 98.1 (12 Jul 2023 08:38), Max: 98.7 (12 Jul 2023 00:06)  HR: 84 (12 Jul 2023 08:38) (68 - 84)  BP: 115/71 (12 Jul 2023 08:38) (102/68 - 121/82)  BP(mean): --  RR: 18 (12 Jul 2023 08:38) (18 - 18)  SpO2: 98% (12 Jul 2023 08:38) (98% - 99%)    Parameters below as of 12 Jul 2023 08:38  Patient On (Oxygen Delivery Method): room air                          12.1   7.46  )-----------( 202      ( 11 Jul 2023 07:10 )             35.6

## 2023-07-13 RX ORDER — GABAPENTIN 300 MG/1
300 CAPSULE ORAL
Qty: 90 | Refills: 1 | Status: ACTIVE | COMMUNITY
Start: 2023-07-13 | End: 1900-01-01

## 2023-07-25 ENCOUNTER — RESULT CHARGE (OUTPATIENT)
Age: 48
End: 2023-07-25

## 2023-07-25 ENCOUNTER — APPOINTMENT (OUTPATIENT)
Dept: UROGYNECOLOGY | Facility: CLINIC | Age: 48
End: 2023-07-25
Payer: COMMERCIAL

## 2023-07-25 VITALS — OXYGEN SATURATION: 100 % | SYSTOLIC BLOOD PRESSURE: 107 MMHG | DIASTOLIC BLOOD PRESSURE: 75 MMHG | HEART RATE: 71 BPM

## 2023-07-25 DIAGNOSIS — B37.31 ACUTE CANDIDIASIS OF VULVA AND VAGINA: ICD-10-CM

## 2023-07-25 PROCEDURE — 99024 POSTOP FOLLOW-UP VISIT: CPT

## 2023-07-25 RX ORDER — FLUCONAZOLE 150 MG/1
150 TABLET ORAL
Qty: 2 | Refills: 0 | Status: ACTIVE | COMMUNITY
Start: 2023-07-25 | End: 1900-01-01

## 2023-07-25 NOTE — OBJECTIVE
[Post Void Residual ____ ml] : Post Void Residual was [unfilled] ml [Soft and Nontender] : soft and nontender [Clean, Dry, Intact] : Clean, Dry, Intact [Good Support] : Good support [Healing well] : healing well [No Masses or Tenderness] : no masses or tenderness [FreeTextEntry3] : no exposure , suture intact incision line intact. + white discharge no foul smell ,

## 2023-07-25 NOTE — SUBJECTIVE
[FreeTextEntry1] : Overall happy doing well , with some itching in vagina .  [FreeTextEntry8] : no changes [FreeTextEntry7] : occasional in left groin , mostly gas  [FreeTextEntry6] : good  [FreeTextEntry5] : no leaking urine with laugh cough or sneeze,  good urine flow, no foul smell no dysuria , + urge incontinence x 2  [FreeTextEntry4] : taking miralax  [FreeTextEntry3] : good [FreeTextEntry2] : ok

## 2023-07-25 NOTE — ASSESSMENT
[FreeTextEntry1] : 48y/o female post combination case with Dr. Thomas robotic annette,bs,scp,sling and cystoscopy , Dr. Daley rectopexy, Dr. Linton left hernia repair . op report reviewed with pt all questions answered. \cate Del Cid is doing well + yeast vaginitis noted will treat with diflucan \par op report reviewed all questions answered. \cate Del Cid has not followed up with colorectal or general surgery we discussed calling to make post op appointment visit pt understands and agree.

## 2023-07-25 NOTE — DISCUSSION/SUMMARY
[Post-Op instructions given. Pt/family verbalizes understanding] : post-operative instructions were provided to the patient/family who verbalize understanding [FreeTextEntry1] : no heavy lifting pushing or pulling \par nothing in vagina \par no pool no swimming pool \par no lifting over 10 pounds

## 2023-08-10 ENCOUNTER — APPOINTMENT (OUTPATIENT)
Dept: SURGERY | Facility: CLINIC | Age: 48
End: 2023-08-10
Payer: COMMERCIAL

## 2023-08-10 ENCOUNTER — NON-APPOINTMENT (OUTPATIENT)
Age: 48
End: 2023-08-10

## 2023-08-10 VITALS
SYSTOLIC BLOOD PRESSURE: 111 MMHG | OXYGEN SATURATION: 100 % | DIASTOLIC BLOOD PRESSURE: 73 MMHG | RESPIRATION RATE: 16 BRPM | TEMPERATURE: 98.1 F | HEART RATE: 69 BPM | HEIGHT: 62 IN | WEIGHT: 146 LBS | BODY MASS INDEX: 26.87 KG/M2

## 2023-08-10 PROCEDURE — 99024 POSTOP FOLLOW-UP VISIT: CPT

## 2023-08-10 NOTE — HISTORY OF PRESENT ILLNESS
[de-identified] : Ms. MELGAR is a 47 year old woman status post robotic left inguinal hernia repair as part of joint case with Dr CAREN Stoll and Dr Daley who comes in today for postop visit. She is doing well. Denies pain. Denies fever/chills. No redness or pain at incision sites. Tolerating diet. Normal bowel movements.

## 2023-08-10 NOTE — PHYSICAL EXAM
[de-identified] : No acute distress [de-identified] : soft, nontender. no rebound or guarding.  incisions well healed

## 2023-08-10 NOTE — ASSESSMENT
[FreeTextEntry1] : Ms. MELGAR is a 47 year old woman status post robotic left inguinal hernia repair as part of joint case with Dr CAREN Stoll and Dr Daley, doing well

## 2023-08-16 ENCOUNTER — APPOINTMENT (OUTPATIENT)
Dept: COLORECTAL SURGERY | Facility: CLINIC | Age: 48
End: 2023-08-16
Payer: COMMERCIAL

## 2023-08-16 VITALS
BODY MASS INDEX: 27.23 KG/M2 | HEIGHT: 62 IN | TEMPERATURE: 98 F | RESPIRATION RATE: 14 BRPM | WEIGHT: 148 LBS | OXYGEN SATURATION: 98 % | SYSTOLIC BLOOD PRESSURE: 119 MMHG | DIASTOLIC BLOOD PRESSURE: 80 MMHG | HEART RATE: 62 BPM

## 2023-08-16 DIAGNOSIS — Z09 ENCOUNTER FOR FOLLOW-UP EXAMINATION AFTER COMPLETED TREATMENT FOR CONDITIONS OTHER THAN MALIGNANT NEOPLASM: ICD-10-CM

## 2023-08-16 DIAGNOSIS — K56.1 INTUSSUSCEPTION: ICD-10-CM

## 2023-08-16 PROCEDURE — 99024 POSTOP FOLLOW-UP VISIT: CPT

## 2023-08-16 NOTE — HISTORY OF PRESENT ILLNESS
[FreeTextEntry1] : 47-year-old female presents for postoperative visit after undergoing a robotic suture rectopexy in combination with sacrocolpopexy and inguinal hernia repair.  She has been doing very well from surgery.  She is having regular bowel movements without difficulty.  She denies any pain.  She continues to use magnesium 3 times a day and occasionally adds MiraLAX to help.

## 2023-08-16 NOTE — PHYSICAL EXAM
[Respiratory Effort] : normal respiratory effort [Calm] : calm [de-identified] : Soft, nontender, nondistended.  No mass or hernias initiated.  Healed laparoscopic incisions. [de-identified] : Well-appearing, in no distress [de-identified] : Normocephalic, Atraumatic [de-identified] : Moves extremities without difficulty [de-identified] : Warm and dry [de-identified] : Alert and oriented x3

## 2023-08-16 NOTE — PLAN
[TextEntry] : 47-year-old female status post suture rectopexy who is doing very well.  Continue high-fiber diet and bowel regimen.  Avoid straining with bowel movements.  Follow-up as needed.

## 2023-08-29 ENCOUNTER — APPOINTMENT (OUTPATIENT)
Dept: UROGYNECOLOGY | Facility: CLINIC | Age: 48
End: 2023-08-29
Payer: COMMERCIAL

## 2023-08-29 VITALS
DIASTOLIC BLOOD PRESSURE: 76 MMHG | SYSTOLIC BLOOD PRESSURE: 107 MMHG | RESPIRATION RATE: 14 BRPM | HEART RATE: 67 BPM | OXYGEN SATURATION: 100 %

## 2023-08-29 DIAGNOSIS — Z98.890 OTHER SPECIFIED POSTPROCEDURAL STATES: ICD-10-CM

## 2023-08-29 LAB
BILIRUB UR QL STRIP: NEGATIVE
CLARITY UR: CLEAR
COLLECTION METHOD: NORMAL
GLUCOSE UR-MCNC: NEGATIVE
HCG UR QL: 0.2 EU/DL
HGB UR QL STRIP.AUTO: NEGATIVE
KETONES UR-MCNC: NEGATIVE
LEUKOCYTE ESTERASE UR QL STRIP: NEGATIVE
NITRITE UR QL STRIP: NEGATIVE
PH UR STRIP: 6.5
PROT UR STRIP-MCNC: NEGATIVE
SP GR UR STRIP: 1.01

## 2023-08-29 PROCEDURE — 99024 POSTOP FOLLOW-UP VISIT: CPT

## 2023-08-29 PROCEDURE — 81003 URINALYSIS AUTO W/O SCOPE: CPT | Mod: QW

## 2023-08-29 NOTE — ASSESSMENT
[FreeTextEntry1] : 46y/o female post combination robotic surgery with Dr. Meneses for stress incontinence and prolapse, pt s/p robotic annette, scp, bilateral salpingectomy midurethral sling and cystoscopy.  Dr. Daley for rectal intussusception s/p robotic rectopexy.  Dr. Linton left inguinal hernia repair on July 10,2023.

## 2023-08-29 NOTE — OBJECTIVE
[Post Void Residual ____ ml] : Post Void Residual was [unfilled] ml [Soft and Nontender] : soft and nontender [Clean, Dry, Intact] : Clean, Dry, Intact [Good Support] : Good support [Healing well] : healing well [No Masses or Tenderness] : no masses or tenderness [FreeTextEntry3] : no exposure, no erosion, no active bleeding non tender exam

## 2023-08-29 NOTE — SUBJECTIVE
[FreeTextEntry1] : doing well no current concerns  [FreeTextEntry8] : no changes  [FreeTextEntry7] : denies  [FreeTextEntry6] : good  [FreeTextEntry5] : no leakage of urine  [FreeTextEntry4] : +bm taking.  [FreeTextEntry3] : good [FreeTextEntry2] : unchanged

## 2023-08-29 NOTE — END OF VISIT
[FreeTextEntry3] : I have reviewed and agree with the nurse the findings and edited where appropriate

## 2023-12-19 ENCOUNTER — APPOINTMENT (OUTPATIENT)
Dept: UROGYNECOLOGY | Facility: CLINIC | Age: 48
End: 2023-12-19
Payer: COMMERCIAL

## 2023-12-19 LAB
BILIRUB UR QL STRIP: NEGATIVE
CLARITY UR: NORMAL
COLLECTION METHOD: NORMAL
GLUCOSE UR-MCNC: NEGATIVE
HCG UR QL: 0.2 EU/DL
HGB UR QL STRIP.AUTO: NORMAL
KETONES UR-MCNC: NORMAL
LEUKOCYTE ESTERASE UR QL STRIP: NEGATIVE
NITRITE UR QL STRIP: NEGATIVE
PH UR STRIP: 6.5
PROT UR STRIP-MCNC: NEGATIVE
SP GR UR STRIP: 1.01

## 2023-12-19 PROCEDURE — 99213 OFFICE O/P EST LOW 20 MIN: CPT

## 2023-12-19 PROCEDURE — 51798 US URINE CAPACITY MEASURE: CPT

## 2023-12-19 PROCEDURE — 81003 URINALYSIS AUTO W/O SCOPE: CPT | Mod: QW

## 2023-12-19 NOTE — ADDENDUM
[FreeTextEntry1] : This note was written by Ami Bae, acting as the  for Dr. Loyd. This note accurately reflects the work and decisions made by Dr. Loyd. no

## 2023-12-19 NOTE — DISCUSSION/SUMMARY
[FreeTextEntry1] : LEONIE is a 48 year female who presents 5 months s/p combo case COTY MADELINE SCP SLING CYSTO on 07/10/2023. Overall very happy with results. No current concerns.   [] Void every 2-3 hours [] Kegels  f/u annually

## 2024-07-16 NOTE — ASU PREOP CHECKLIST - IV STARTED
Session ID: [id number]  Language: Latvian   ID: #[id number]   Name: Talon  Care of incision after dressing removal discussed, pain level discussed   yes

## 2024-12-03 ENCOUNTER — APPOINTMENT (OUTPATIENT)
Dept: VASCULAR SURGERY | Facility: CLINIC | Age: 49
End: 2024-12-03
Payer: COMMERCIAL

## 2024-12-03 VITALS
HEART RATE: 75 BPM | SYSTOLIC BLOOD PRESSURE: 120 MMHG | OXYGEN SATURATION: 98 % | DIASTOLIC BLOOD PRESSURE: 92 MMHG | WEIGHT: 155 LBS | HEIGHT: 62 IN | BODY MASS INDEX: 28.52 KG/M2

## 2024-12-03 DIAGNOSIS — M79.89 OTHER SPECIFIED SOFT TISSUE DISORDERS: ICD-10-CM

## 2024-12-03 PROCEDURE — 99203 OFFICE O/P NEW LOW 30 MIN: CPT

## 2024-12-03 RX ORDER — NORTRIPTYLINE HYDROCHLORIDE 10 MG/1
10 CAPSULE ORAL
Refills: 0 | Status: ACTIVE | COMMUNITY

## 2024-12-17 ENCOUNTER — APPOINTMENT (OUTPATIENT)
Dept: UROGYNECOLOGY | Facility: CLINIC | Age: 49
End: 2024-12-17

## 2024-12-17 VITALS
BODY MASS INDEX: 27.46 KG/M2 | WEIGHT: 155 LBS | SYSTOLIC BLOOD PRESSURE: 124 MMHG | HEIGHT: 63 IN | HEART RATE: 64 BPM | DIASTOLIC BLOOD PRESSURE: 80 MMHG

## 2024-12-17 LAB
BILIRUB UR QL STRIP: NEGATIVE
CLARITY UR: CLEAR
COLLECTION METHOD: NORMAL
GLUCOSE UR-MCNC: NEGATIVE
HCG UR QL: 0.2
HGB UR QL STRIP.AUTO: NEGATIVE
KETONES UR-MCNC: NEGATIVE
LEUKOCYTE ESTERASE UR QL STRIP: NEGATIVE
NITRITE UR QL STRIP: NEGATIVE
PH UR STRIP: 8
PROT UR STRIP-MCNC: NEGATIVE
SP GR UR STRIP: 1.02

## 2024-12-17 PROCEDURE — 99212 OFFICE O/P EST SF 10 MIN: CPT

## 2024-12-17 PROCEDURE — 51798 US URINE CAPACITY MEASURE: CPT

## 2024-12-17 PROCEDURE — 99459 PELVIC EXAMINATION: CPT

## 2024-12-17 PROCEDURE — 81003 URINALYSIS AUTO W/O SCOPE: CPT | Mod: QW

## 2024-12-26 ENCOUNTER — RESULT REVIEW (OUTPATIENT)
Age: 49
End: 2024-12-26

## 2024-12-26 ENCOUNTER — APPOINTMENT (OUTPATIENT)
Dept: VASCULAR SURGERY | Facility: HOSPITAL | Age: 49
End: 2024-12-26

## 2024-12-31 ENCOUNTER — APPOINTMENT (OUTPATIENT)
Dept: VASCULAR SURGERY | Facility: CLINIC | Age: 49
End: 2024-12-31
Payer: COMMERCIAL

## 2024-12-31 VITALS
SYSTOLIC BLOOD PRESSURE: 102 MMHG | WEIGHT: 155 LBS | HEIGHT: 63 IN | DIASTOLIC BLOOD PRESSURE: 62 MMHG | BODY MASS INDEX: 27.46 KG/M2

## 2024-12-31 DIAGNOSIS — M79.89 OTHER SPECIFIED SOFT TISSUE DISORDERS: ICD-10-CM

## 2024-12-31 PROCEDURE — 99024 POSTOP FOLLOW-UP VISIT: CPT

## 2025-01-06 DIAGNOSIS — Z09 ENCOUNTER FOR FOLLOW-UP EXAMINATION AFTER COMPLETED TREATMENT FOR CONDITIONS OTHER THAN MALIGNANT NEOPLASM: ICD-10-CM

## 2025-03-06 ENCOUNTER — APPOINTMENT (OUTPATIENT)
Dept: MRI IMAGING | Facility: CLINIC | Age: 50
End: 2025-03-06
Payer: COMMERCIAL

## 2025-03-06 PROCEDURE — 73721 MRI JNT OF LWR EXTRE W/O DYE: CPT | Mod: LT

## 2025-03-25 ENCOUNTER — TRANSCRIPTION ENCOUNTER (OUTPATIENT)
Age: 50
End: 2025-03-25

## 2025-03-25 ENCOUNTER — RESULT REVIEW (OUTPATIENT)
Age: 50
End: 2025-03-25

## 2025-03-26 ENCOUNTER — TRANSCRIPTION ENCOUNTER (OUTPATIENT)
Age: 50
End: 2025-03-26

## 2025-03-31 ENCOUNTER — RESULT REVIEW (OUTPATIENT)
Age: 50
End: 2025-03-31

## 2025-03-31 ENCOUNTER — TRANSCRIPTION ENCOUNTER (OUTPATIENT)
Age: 50
End: 2025-03-31

## 2025-04-21 ENCOUNTER — NON-APPOINTMENT (OUTPATIENT)
Age: 50
End: 2025-04-21

## 2025-04-23 ENCOUNTER — APPOINTMENT (OUTPATIENT)
Dept: VASCULAR SURGERY | Facility: CLINIC | Age: 50
End: 2025-04-23

## 2025-04-23 ENCOUNTER — APPOINTMENT (OUTPATIENT)
Dept: INFECTIOUS DISEASE | Facility: CLINIC | Age: 50
End: 2025-04-23
Payer: COMMERCIAL

## 2025-04-23 VITALS
OXYGEN SATURATION: 99 % | HEIGHT: 63 IN | WEIGHT: 142 LBS | HEART RATE: 91 BPM | BODY MASS INDEX: 25.16 KG/M2 | TEMPERATURE: 97 F | DIASTOLIC BLOOD PRESSURE: 72 MMHG | SYSTOLIC BLOOD PRESSURE: 106 MMHG

## 2025-04-23 VITALS
BODY MASS INDEX: 25.16 KG/M2 | HEIGHT: 63 IN | SYSTOLIC BLOOD PRESSURE: 102 MMHG | TEMPERATURE: 98.2 F | WEIGHT: 142 LBS | HEART RATE: 101 BPM | DIASTOLIC BLOOD PRESSURE: 72 MMHG | OXYGEN SATURATION: 97 %

## 2025-04-23 DIAGNOSIS — Z79.2 LONG TERM (CURRENT) USE OF ANTIBIOTICS: ICD-10-CM

## 2025-04-23 DIAGNOSIS — B95.8 UNSPECIFIED STAPHYLOCOCCUS AS THE CAUSE OF DISEASES CLASSIFIED ELSEWHERE: ICD-10-CM

## 2025-04-23 PROBLEM — L02.31 GLUTEAL ABSCESS: Status: ACTIVE | Noted: 2025-04-23

## 2025-04-23 PROBLEM — L02.416 ABSCESS OF LEFT THIGH: Status: ACTIVE | Noted: 2025-04-23

## 2025-04-23 PROCEDURE — 99214 OFFICE O/P EST MOD 30 MIN: CPT

## 2025-04-28 RX ORDER — OXYCODONE HYDROCHLORIDE 5 MG/1
5 CAPSULE ORAL
Refills: 0 | Status: ACTIVE | COMMUNITY

## 2025-04-28 RX ORDER — GABAPENTIN 100 MG/1
100 CAPSULE ORAL 4 TIMES DAILY
Qty: 120 | Refills: 0 | Status: ACTIVE | COMMUNITY

## 2025-04-30 ENCOUNTER — APPOINTMENT (OUTPATIENT)
Dept: VASCULAR SURGERY | Facility: CLINIC | Age: 50
End: 2025-04-30
Payer: COMMERCIAL

## 2025-04-30 VITALS
WEIGHT: 147 LBS | TEMPERATURE: 97.6 F | HEART RATE: 86 BPM | OXYGEN SATURATION: 98 % | BODY MASS INDEX: 26.05 KG/M2 | DIASTOLIC BLOOD PRESSURE: 66 MMHG | SYSTOLIC BLOOD PRESSURE: 110 MMHG | HEIGHT: 63 IN

## 2025-04-30 DIAGNOSIS — L02.31 CUTANEOUS ABSCESS OF BUTTOCK: ICD-10-CM

## 2025-04-30 DIAGNOSIS — Z09 ENCOUNTER FOR FOLLOW-UP EXAMINATION AFTER COMPLETED TREATMENT FOR CONDITIONS OTHER THAN MALIGNANT NEOPLASM: ICD-10-CM

## 2025-04-30 PROCEDURE — 99024 POSTOP FOLLOW-UP VISIT: CPT

## 2025-05-07 ENCOUNTER — APPOINTMENT (OUTPATIENT)
Dept: INFECTIOUS DISEASE | Facility: CLINIC | Age: 50
End: 2025-05-07
Payer: COMMERCIAL

## 2025-05-07 VITALS
OXYGEN SATURATION: 99 % | DIASTOLIC BLOOD PRESSURE: 70 MMHG | HEIGHT: 63 IN | BODY MASS INDEX: 25.87 KG/M2 | SYSTOLIC BLOOD PRESSURE: 110 MMHG | WEIGHT: 146 LBS | TEMPERATURE: 98.1 F | HEART RATE: 87 BPM

## 2025-05-07 DIAGNOSIS — B95.8 UNSPECIFIED STAPHYLOCOCCUS AS THE CAUSE OF DISEASES CLASSIFIED ELSEWHERE: ICD-10-CM

## 2025-05-07 DIAGNOSIS — Z79.2 LONG TERM (CURRENT) USE OF ANTIBIOTICS: ICD-10-CM

## 2025-05-07 PROCEDURE — 99214 OFFICE O/P EST MOD 30 MIN: CPT

## 2025-05-07 RX ORDER — DOXYCYCLINE 100 MG/1
100 CAPSULE ORAL
Qty: 60 | Refills: 1 | Status: ACTIVE | COMMUNITY
Start: 2025-05-07 | End: 1900-01-01

## 2025-05-14 ENCOUNTER — APPOINTMENT (OUTPATIENT)
Dept: VASCULAR SURGERY | Facility: CLINIC | Age: 50
End: 2025-05-14
Payer: COMMERCIAL

## 2025-05-14 VITALS
DIASTOLIC BLOOD PRESSURE: 78 MMHG | BODY MASS INDEX: 26.4 KG/M2 | HEIGHT: 63 IN | WEIGHT: 149 LBS | SYSTOLIC BLOOD PRESSURE: 100 MMHG | HEART RATE: 74 BPM | OXYGEN SATURATION: 99 % | TEMPERATURE: 92.3 F

## 2025-05-14 DIAGNOSIS — Z98.890 OTHER SPECIFIED POSTPROCEDURAL STATES: ICD-10-CM

## 2025-05-14 PROCEDURE — 99024 POSTOP FOLLOW-UP VISIT: CPT

## 2025-05-21 ENCOUNTER — APPOINTMENT (OUTPATIENT)
Dept: INFECTIOUS DISEASE | Facility: CLINIC | Age: 50
End: 2025-05-21
Payer: COMMERCIAL

## 2025-05-21 VITALS
BODY MASS INDEX: 26.22 KG/M2 | HEIGHT: 63 IN | OXYGEN SATURATION: 99 % | HEART RATE: 98 BPM | TEMPERATURE: 97.1 F | SYSTOLIC BLOOD PRESSURE: 120 MMHG | WEIGHT: 148 LBS | DIASTOLIC BLOOD PRESSURE: 70 MMHG

## 2025-05-21 DIAGNOSIS — Z79.2 LONG TERM (CURRENT) USE OF ANTIBIOTICS: ICD-10-CM

## 2025-05-21 DIAGNOSIS — B95.8 UNSPECIFIED STAPHYLOCOCCUS AS THE CAUSE OF DISEASES CLASSIFIED ELSEWHERE: ICD-10-CM

## 2025-05-21 PROCEDURE — 99214 OFFICE O/P EST MOD 30 MIN: CPT

## 2025-05-28 ENCOUNTER — APPOINTMENT (OUTPATIENT)
Dept: VASCULAR SURGERY | Facility: CLINIC | Age: 50
End: 2025-05-28
Payer: COMMERCIAL

## 2025-05-28 VITALS
HEART RATE: 75 BPM | SYSTOLIC BLOOD PRESSURE: 106 MMHG | HEIGHT: 63 IN | OXYGEN SATURATION: 98 % | WEIGHT: 150 LBS | DIASTOLIC BLOOD PRESSURE: 62 MMHG | BODY MASS INDEX: 26.58 KG/M2 | TEMPERATURE: 93.7 F

## 2025-05-28 DIAGNOSIS — L02.416 CUTANEOUS ABSCESS OF LEFT LOWER LIMB: ICD-10-CM

## 2025-05-28 DIAGNOSIS — L02.31 CUTANEOUS ABSCESS OF BUTTOCK: ICD-10-CM

## 2025-05-28 PROCEDURE — 99213 OFFICE O/P EST LOW 20 MIN: CPT

## 2025-05-28 RX ORDER — GABAPENTIN 300 MG/1
300 CAPSULE ORAL 3 TIMES DAILY
Qty: 30 | Refills: 0 | Status: ACTIVE | COMMUNITY
Start: 2025-05-28 | End: 1900-01-01

## 2025-06-11 ENCOUNTER — APPOINTMENT (OUTPATIENT)
Dept: VASCULAR SURGERY | Facility: CLINIC | Age: 50
End: 2025-06-11
Payer: COMMERCIAL

## 2025-06-11 VITALS
HEIGHT: 63 IN | HEART RATE: 86 BPM | BODY MASS INDEX: 27.29 KG/M2 | WEIGHT: 154 LBS | DIASTOLIC BLOOD PRESSURE: 60 MMHG | SYSTOLIC BLOOD PRESSURE: 96 MMHG | OXYGEN SATURATION: 98 %

## 2025-06-11 PROBLEM — R52 PAIN, ACUTE: Status: ACTIVE | Noted: 2025-06-11

## 2025-06-11 PROCEDURE — 99213 OFFICE O/P EST LOW 20 MIN: CPT

## 2025-06-11 RX ORDER — CYCLOBENZAPRINE HYDROCHLORIDE 5 MG/1
5 TABLET, FILM COATED ORAL
Qty: 30 | Refills: 0 | Status: ACTIVE | COMMUNITY
Start: 2025-06-11 | End: 1900-01-01

## 2025-06-17 PROBLEM — R00.2 PALPITATIONS: Status: ACTIVE | Noted: 2025-06-17

## 2025-06-17 LAB
ALBUMIN SERPL ELPH-MCNC: 4.3 G/DL
ALP BLD-CCNC: 72 U/L
ALT SERPL-CCNC: 13 U/L
ANION GAP SERPL CALC-SCNC: 13 MMOL/L
AST SERPL-CCNC: 19 U/L
BASOPHILS # BLD AUTO: 0.03 K/UL
BASOPHILS NFR BLD AUTO: 0.5 %
BILIRUB SERPL-MCNC: 0.2 MG/DL
BUN SERPL-MCNC: 15 MG/DL
CALCIUM SERPL-MCNC: 9.1 MG/DL
CHLORIDE SERPL-SCNC: 102 MMOL/L
CO2 SERPL-SCNC: 24 MMOL/L
CREAT SERPL-MCNC: 0.66 MG/DL
CRP SERPL-MCNC: <3 MG/L
EGFRCR SERPLBLD CKD-EPI 2021: 107 ML/MIN/1.73M2
EOSINOPHIL # BLD AUTO: 0.1 K/UL
EOSINOPHIL NFR BLD AUTO: 1.6 %
ERYTHROCYTE [SEDIMENTATION RATE] IN BLOOD BY WESTERGREN METHOD: 5 MM/HR
GLUCOSE SERPL-MCNC: 90 MG/DL
HCT VFR BLD CALC: 39.8 %
HGB BLD-MCNC: 12.7 G/DL
IMM GRANULOCYTES NFR BLD AUTO: 0.2 %
LYMPHOCYTES # BLD AUTO: 2.81 K/UL
LYMPHOCYTES NFR BLD AUTO: 43.8 %
MAN DIFF?: NORMAL
MCHC RBC-ENTMCNC: 28.7 PG
MCHC RBC-ENTMCNC: 31.9 G/DL
MCV RBC AUTO: 90 FL
MONOCYTES # BLD AUTO: 0.41 K/UL
MONOCYTES NFR BLD AUTO: 6.4 %
NEUTROPHILS # BLD AUTO: 3.05 K/UL
NEUTROPHILS NFR BLD AUTO: 47.5 %
PLATELET # BLD AUTO: 268 K/UL
POTASSIUM SERPL-SCNC: 4.1 MMOL/L
PROT SERPL-MCNC: 6.4 G/DL
RBC # BLD: 4.42 M/UL
RBC # FLD: 13.6 %
SODIUM SERPL-SCNC: 139 MMOL/L
WBC # FLD AUTO: 6.41 K/UL

## 2025-06-18 ENCOUNTER — APPOINTMENT (OUTPATIENT)
Dept: VASCULAR SURGERY | Facility: CLINIC | Age: 50
End: 2025-06-18

## 2025-06-18 ENCOUNTER — APPOINTMENT (OUTPATIENT)
Dept: INFECTIOUS DISEASE | Facility: CLINIC | Age: 50
End: 2025-06-18
Payer: COMMERCIAL

## 2025-06-18 VITALS
SYSTOLIC BLOOD PRESSURE: 98 MMHG | WEIGHT: 150 LBS | HEIGHT: 63 IN | BODY MASS INDEX: 26.58 KG/M2 | DIASTOLIC BLOOD PRESSURE: 52 MMHG | TEMPERATURE: 98 F

## 2025-06-18 PROCEDURE — 99213 OFFICE O/P EST LOW 20 MIN: CPT

## 2025-06-23 ENCOUNTER — APPOINTMENT (OUTPATIENT)
Dept: VASCULAR SURGERY | Facility: CLINIC | Age: 50
End: 2025-06-23

## 2025-06-23 VITALS
BODY MASS INDEX: 27.29 KG/M2 | HEIGHT: 63 IN | WEIGHT: 154 LBS | OXYGEN SATURATION: 99 % | HEART RATE: 71 BPM | SYSTOLIC BLOOD PRESSURE: 108 MMHG | DIASTOLIC BLOOD PRESSURE: 64 MMHG

## 2025-06-23 PROBLEM — L92.9 HYPERGRANULATION: Status: ACTIVE | Noted: 2025-06-11

## 2025-06-23 PROCEDURE — 99024 POSTOP FOLLOW-UP VISIT: CPT

## 2025-06-27 RX ORDER — CYCLOBENZAPRINE HYDROCHLORIDE 5 MG/1
5 TABLET, FILM COATED ORAL AT BEDTIME
Qty: 30 | Refills: 3 | Status: ACTIVE | COMMUNITY
Start: 2025-06-27 | End: 1900-01-01

## 2025-06-30 ENCOUNTER — RESULT REVIEW (OUTPATIENT)
Age: 50
End: 2025-06-30

## 2025-07-01 ENCOUNTER — RX RENEWAL (OUTPATIENT)
Age: 50
End: 2025-07-01

## 2025-07-09 ENCOUNTER — APPOINTMENT (OUTPATIENT)
Dept: VASCULAR SURGERY | Facility: CLINIC | Age: 50
End: 2025-07-09
Payer: COMMERCIAL

## 2025-07-09 ENCOUNTER — APPOINTMENT (OUTPATIENT)
Dept: INFECTIOUS DISEASE | Facility: CLINIC | Age: 50
End: 2025-07-09
Payer: COMMERCIAL

## 2025-07-09 VITALS
SYSTOLIC BLOOD PRESSURE: 110 MMHG | DIASTOLIC BLOOD PRESSURE: 60 MMHG | HEIGHT: 63 IN | WEIGHT: 156 LBS | BODY MASS INDEX: 27.64 KG/M2 | HEART RATE: 68 BPM | OXYGEN SATURATION: 99 % | TEMPERATURE: 97.2 F

## 2025-07-09 VITALS
TEMPERATURE: 97.2 F | HEIGHT: 63 IN | WEIGHT: 156 LBS | DIASTOLIC BLOOD PRESSURE: 60 MMHG | BODY MASS INDEX: 27.64 KG/M2 | HEART RATE: 68 BPM | SYSTOLIC BLOOD PRESSURE: 110 MMHG | OXYGEN SATURATION: 99 %

## 2025-07-09 PROCEDURE — 99212 OFFICE O/P EST SF 10 MIN: CPT

## 2025-07-09 PROCEDURE — 99213 OFFICE O/P EST LOW 20 MIN: CPT

## 2025-07-09 RX ORDER — AMOXICILLIN AND CLAVULANATE POTASSIUM 875; 125 MG/1; MG/1
875-125 TABLET, COATED ORAL
Qty: 14 | Refills: 1 | Status: DISCONTINUED | COMMUNITY
Start: 2025-07-03 | End: 2025-07-09

## 2025-07-16 LAB
ALBUMIN SERPL ELPH-MCNC: 4.7 G/DL
ALP BLD-CCNC: 86 U/L
ALT SERPL-CCNC: 14 U/L
ANION GAP SERPL CALC-SCNC: 14 MMOL/L
AST SERPL-CCNC: 23 U/L
BASOPHILS # BLD AUTO: 0.04 K/UL
BASOPHILS NFR BLD AUTO: 0.7 %
BILIRUB SERPL-MCNC: 0.2 MG/DL
BUN SERPL-MCNC: 15 MG/DL
CALCIUM SERPL-MCNC: 9.8 MG/DL
CHLORIDE SERPL-SCNC: 100 MMOL/L
CO2 SERPL-SCNC: 26 MMOL/L
CREAT SERPL-MCNC: 0.64 MG/DL
CRP SERPL-MCNC: <3 MG/L
EGFRCR SERPLBLD CKD-EPI 2021: 108 ML/MIN/1.73M2
EOSINOPHIL # BLD AUTO: 0.07 K/UL
EOSINOPHIL NFR BLD AUTO: 1.2 %
ERYTHROCYTE [SEDIMENTATION RATE] IN BLOOD BY WESTERGREN METHOD: 2 MM/HR
GLUCOSE SERPL-MCNC: 100 MG/DL
HCT VFR BLD CALC: 45.4 %
HGB BLD-MCNC: 14.6 G/DL
IMM GRANULOCYTES NFR BLD AUTO: 0.2 %
LYMPHOCYTES # BLD AUTO: 2.73 K/UL
LYMPHOCYTES NFR BLD AUTO: 45 %
MAN DIFF?: NORMAL
MCHC RBC-ENTMCNC: 28.9 PG
MCHC RBC-ENTMCNC: 32.2 G/DL
MCV RBC AUTO: 89.7 FL
MONOCYTES # BLD AUTO: 0.38 K/UL
MONOCYTES NFR BLD AUTO: 6.3 %
NEUTROPHILS # BLD AUTO: 2.84 K/UL
NEUTROPHILS NFR BLD AUTO: 46.6 %
PLATELET # BLD AUTO: 260 K/UL
POTASSIUM SERPL-SCNC: 4.2 MMOL/L
PROT SERPL-MCNC: 7.5 G/DL
RBC # BLD: 5.06 M/UL
RBC # FLD: 13.4 %
SODIUM SERPL-SCNC: 139 MMOL/L
WBC # FLD AUTO: 6.07 K/UL

## 2025-08-06 ENCOUNTER — APPOINTMENT (OUTPATIENT)
Dept: VASCULAR SURGERY | Facility: CLINIC | Age: 50
End: 2025-08-06
Payer: COMMERCIAL

## 2025-08-06 VITALS
WEIGHT: 154 LBS | HEIGHT: 63 IN | HEART RATE: 83 BPM | DIASTOLIC BLOOD PRESSURE: 56 MMHG | OXYGEN SATURATION: 96 % | SYSTOLIC BLOOD PRESSURE: 98 MMHG | BODY MASS INDEX: 27.29 KG/M2

## 2025-08-06 DIAGNOSIS — G89.29 OTHER CHRONIC PAIN: ICD-10-CM

## 2025-08-06 DIAGNOSIS — L25.9 UNSPECIFIED CONTACT DERMATITIS, UNSPECIFIED CAUSE: ICD-10-CM

## 2025-08-06 DIAGNOSIS — T14.8XXA OTHER INJURY OF UNSPECIFIED BODY REGION, INITIAL ENCOUNTER: ICD-10-CM

## 2025-08-06 PROCEDURE — 99212 OFFICE O/P EST SF 10 MIN: CPT

## 2025-08-06 RX ORDER — HYDROCORTISONE 0.01 G/G
1 CREAM TOPICAL TWICE DAILY
Qty: 1 | Refills: 0 | Status: ACTIVE | COMMUNITY
Start: 2025-08-06 | End: 1900-01-01

## (undated) DEVICE — XI ARM NEEDLE DRIVER SUTURECUT MEGA 8MM

## (undated) DEVICE — DRAPE ROBOTIC

## (undated) DEVICE — XI TIP COVER

## (undated) DEVICE — SUT GORETEX CV-2 (0) 36" THX-26 DA

## (undated) DEVICE — SUT VICRYL 2-0 27" SH UNDYED

## (undated) DEVICE — SOL IRR POUR H2O 1000ML

## (undated) DEVICE — VENODYNE/SCD SLEEVE CALF MEDIUM

## (undated) DEVICE — DRAPE TOWEL BLUE STICKY

## (undated) DEVICE — XI DRAPE COLUMN

## (undated) DEVICE — XI ARM FORCEP PROGRASP 8MM

## (undated) DEVICE — ELCTR CORD FOOTSWITCH 1PLR LAPSCP 10FT

## (undated) DEVICE — POSITIONER PINK PAD PIGAZZI SYSTEM

## (undated) DEVICE — XI ARM FORCEP TENACULUM

## (undated) DEVICE — GLV 6.5 PROTEXIS (WHITE)

## (undated) DEVICE — SUT VICRYL 0 27" UR-6

## (undated) DEVICE — ELCTR GROUNDING PAD ADULT COVIDIEN

## (undated) DEVICE — DRAPE XL SHEET 77X98"

## (undated) DEVICE — XI OBTURATOR OPTICAL BLADELESS 8MM

## (undated) DEVICE — XI DRAPE ARM

## (undated) DEVICE — PREP CHLORAPREP HI-LITE ORANGE 26ML

## (undated) DEVICE — DRAPE GYN W LEGGINGS 3X125"

## (undated) DEVICE — PREP DYNA-HEX CHG 4% 4OZ BOTTLE (BACTOSHIELD)

## (undated) DEVICE — WARMING BLANKET UPPER ADULT

## (undated) DEVICE — DRSG DERMABOND 0.7ML

## (undated) DEVICE — TIP METZENBAUM SCISSOR MONOPOLAR ENDOCUT (ORANGE)

## (undated) DEVICE — PACK ROBOTIC

## (undated) DEVICE — LONE STAR RETRACTOR RING 32.5CM X 18.3CM DISP

## (undated) DEVICE — SUT VLOC 180 2-0 6" GS-22 GREEN

## (undated) DEVICE — TUBING CONNECTING 6MM 20FT

## (undated) DEVICE — ENDOCATCH 10MM SPECIMEN POUCH

## (undated) DEVICE — XI ARM SCISSOR MONO CURVED

## (undated) DEVICE — PACK MINOR WITH LAP

## (undated) DEVICE — SUT MONOCRYL 4-0 27" PS-2 UNDYED

## (undated) DEVICE — XI SEAL UNIV 5- 8 MM

## (undated) DEVICE — TUBING AIRSEAL TRI-LUMEN FILTERED

## (undated) DEVICE — SUT ETHIBOND 0 36" SH DA

## (undated) DEVICE — GOWN XXXL

## (undated) DEVICE — TUBING STRYKEFLOW II SUCTION / IRRIGATOR

## (undated) DEVICE — SOL IRR POUR NS 0.9% 1000ML

## (undated) DEVICE — LONE STAR ELASTIC STAY HOOK 5MM SHARP

## (undated) DEVICE — DRAPE MAJOR ABDOMINAL W POUCHES

## (undated) DEVICE — SUT VLOC 180 3-0 6" V-20 GREEN

## (undated) DEVICE — TROCAR SURGIQUEST AIRSEAL 8MMX100MM

## (undated) DEVICE — PREP TRAY DRY SKIN PREP SCRUB